# Patient Record
Sex: FEMALE | Race: WHITE | NOT HISPANIC OR LATINO | Employment: OTHER | URBAN - METROPOLITAN AREA
[De-identification: names, ages, dates, MRNs, and addresses within clinical notes are randomized per-mention and may not be internally consistent; named-entity substitution may affect disease eponyms.]

---

## 2018-02-12 ENCOUNTER — HISTORICAL (OUTPATIENT)
Dept: LAB | Facility: HOSPITAL | Age: 63
End: 2018-02-12

## 2018-02-12 LAB
ABS NEUT (OLG): 8.74 X10(3)/MCL (ref 2.1–9.2)
ALBUMIN SERPL-MCNC: 3.9 GM/DL (ref 3.4–5)
ALBUMIN/GLOB SERPL: 1.2 {RATIO}
ALP SERPL-CCNC: 73 UNIT/L (ref 38–126)
ALT SERPL-CCNC: 23 UNIT/L (ref 12–78)
AST SERPL-CCNC: 8 UNIT/L (ref 15–37)
BASOPHILS # BLD AUTO: 0.1 X10(3)/MCL (ref 0–0.2)
BASOPHILS NFR BLD AUTO: 1 %
BILIRUB SERPL-MCNC: 0.4 MG/DL (ref 0.2–1)
BILIRUBIN DIRECT+TOT PNL SERPL-MCNC: 0.1 MG/DL (ref 0–0.2)
BILIRUBIN DIRECT+TOT PNL SERPL-MCNC: 0.3 MG/DL (ref 0–0.8)
BUN SERPL-MCNC: 13 MG/DL (ref 7–18)
CALCIUM SERPL-MCNC: 9.6 MG/DL (ref 8.5–10.1)
CHLORIDE SERPL-SCNC: 102 MMOL/L (ref 98–107)
CO2 SERPL-SCNC: 24 MMOL/L (ref 21–32)
CREAT SERPL-MCNC: 0.61 MG/DL (ref 0.55–1.02)
EOSINOPHIL # BLD AUTO: 0.3 X10(3)/MCL (ref 0–0.9)
EOSINOPHIL NFR BLD AUTO: 2 %
ERYTHROCYTE [DISTWIDTH] IN BLOOD BY AUTOMATED COUNT: 13 % (ref 11.5–17)
GLOBULIN SER-MCNC: 3.3 GM/DL (ref 2.4–3.5)
GLUCOSE SERPL-MCNC: 130 MG/DL (ref 74–106)
HCT VFR BLD AUTO: 40.9 % (ref 37–47)
HGB BLD-MCNC: 13.1 GM/DL (ref 12–16)
LYMPHOCYTES # BLD AUTO: 3.1 X10(3)/MCL (ref 0.6–4.6)
LYMPHOCYTES NFR BLD AUTO: 24 %
MCH RBC QN AUTO: 27.8 PG (ref 27–31)
MCHC RBC AUTO-ENTMCNC: 32 GM/DL (ref 33–36)
MCV RBC AUTO: 86.8 FL (ref 80–94)
MONOCYTES # BLD AUTO: 0.9 X10(3)/MCL (ref 0.1–1.3)
MONOCYTES NFR BLD AUTO: 7 %
NEUTROPHILS # BLD AUTO: 8.74 X10(3)/MCL (ref 1.4–7.9)
NEUTROPHILS NFR BLD AUTO: 66 %
PLATELET # BLD AUTO: 348 X10(3)/MCL (ref 130–400)
PMV BLD AUTO: 10 FL (ref 9.4–12.4)
POTASSIUM SERPL-SCNC: 4.7 MMOL/L (ref 3.5–5.1)
PROT SERPL-MCNC: 7.2 GM/DL (ref 6.4–8.2)
RBC # BLD AUTO: 4.71 X10(6)/MCL (ref 4.2–5.4)
SODIUM SERPL-SCNC: 136 MMOL/L (ref 136–145)
WBC # SPEC AUTO: 13.2 X10(3)/MCL (ref 4.5–11.5)

## 2018-02-14 ENCOUNTER — HISTORICAL (OUTPATIENT)
Dept: RADIOLOGY | Facility: HOSPITAL | Age: 63
End: 2018-02-14

## 2018-02-21 ENCOUNTER — HISTORICAL (OUTPATIENT)
Dept: ADMINISTRATIVE | Facility: HOSPITAL | Age: 63
End: 2018-02-21

## 2018-03-26 ENCOUNTER — HISTORICAL (OUTPATIENT)
Dept: ADMINISTRATIVE | Facility: HOSPITAL | Age: 63
End: 2018-03-26

## 2018-03-26 LAB
ABS NEUT (OLG): 8.33 X10(3)/MCL (ref 2.1–9.2)
ALBUMIN SERPL-MCNC: 4.1 GM/DL (ref 3.4–5)
ALBUMIN/GLOB SERPL: 1.2 RATIO (ref 1.1–2)
ALP SERPL-CCNC: 85 UNIT/L (ref 38–126)
ALT SERPL-CCNC: 24 UNIT/L (ref 12–78)
AST SERPL-CCNC: 9 UNIT/L (ref 15–37)
BASOPHILS # BLD AUTO: 0.1 X10(3)/MCL (ref 0–0.2)
BASOPHILS NFR BLD AUTO: 0.5 %
BILIRUB SERPL-MCNC: 0.5 MG/DL (ref 0.2–1)
BILIRUBIN DIRECT+TOT PNL SERPL-MCNC: 0.1 MG/DL (ref 0–0.5)
BILIRUBIN DIRECT+TOT PNL SERPL-MCNC: 0.4 MG/DL (ref 0–0.8)
BUN SERPL-MCNC: 18 MG/DL (ref 7–18)
CALCIUM SERPL-MCNC: 9.8 MG/DL (ref 8.5–10.1)
CHLORIDE SERPL-SCNC: 105 MMOL/L (ref 98–107)
CO2 SERPL-SCNC: 25 MMOL/L (ref 21–32)
CREAT SERPL-MCNC: 0.71 MG/DL (ref 0.55–1.02)
DEPRECATED CALCIDIOL+CALCIFEROL SERPL-MC: 24.33 NG/ML (ref 30–80)
EOSINOPHIL # BLD AUTO: 0.3 X10(3)/MCL (ref 0–0.9)
EOSINOPHIL NFR BLD AUTO: 2.3 %
ERYTHROCYTE [DISTWIDTH] IN BLOOD BY AUTOMATED COUNT: 14 % (ref 11.5–17)
GLOBULIN SER-MCNC: 3.3 GM/DL (ref 2.4–3.5)
GLUCOSE SERPL-MCNC: 103 MG/DL (ref 74–106)
HCT VFR BLD AUTO: 39.6 % (ref 37–47)
HGB BLD-MCNC: 12.7 GM/DL (ref 12–16)
LYMPHOCYTES # BLD AUTO: 3.6 X10(3)/MCL (ref 0.6–4.6)
LYMPHOCYTES NFR BLD AUTO: 27 %
MCH RBC QN AUTO: 27.8 PG (ref 27–31)
MCHC RBC AUTO-ENTMCNC: 32.1 GM/DL (ref 33–36)
MCV RBC AUTO: 86.7 FL (ref 80–94)
MONOCYTES # BLD AUTO: 1 X10(3)/MCL (ref 0.1–1.3)
MONOCYTES NFR BLD AUTO: 7.3 %
NEUTROPHILS # BLD AUTO: 8.3 X10(3)/MCL (ref 2.1–9.2)
NEUTROPHILS NFR BLD AUTO: 62.9 %
PLATELET # BLD AUTO: 321 X10(3)/MCL (ref 130–400)
PMV BLD AUTO: 9.7 FL (ref 9.4–12.4)
POTASSIUM SERPL-SCNC: 4.1 MMOL/L (ref 3.5–5.1)
PROT SERPL-MCNC: 7.4 GM/DL (ref 6.4–8.2)
RBC # BLD AUTO: 4.57 X10(6)/MCL (ref 4.2–5.4)
SODIUM SERPL-SCNC: 137 MMOL/L (ref 136–145)
WBC # SPEC AUTO: 13.2 X10(3)/MCL (ref 4.5–11.5)

## 2018-04-03 ENCOUNTER — HISTORICAL (OUTPATIENT)
Dept: RADIOLOGY | Facility: HOSPITAL | Age: 63
End: 2018-04-03

## 2018-04-17 ENCOUNTER — HISTORICAL (OUTPATIENT)
Dept: RADIATION THERAPY | Facility: HOSPITAL | Age: 63
End: 2018-04-17

## 2018-04-20 ENCOUNTER — HISTORICAL (OUTPATIENT)
Dept: HEMATOLOGY/ONCOLOGY | Facility: CLINIC | Age: 63
End: 2018-04-20

## 2018-04-20 LAB
ABS NEUT (OLG): 5.28 X10(3)/MCL (ref 2.1–9.2)
BASOPHILS # BLD AUTO: 0.1 X10(3)/MCL (ref 0–0.2)
BASOPHILS NFR BLD AUTO: 0.6 %
EOSINOPHIL # BLD AUTO: 0.3 X10(3)/MCL (ref 0–0.9)
EOSINOPHIL NFR BLD AUTO: 3 %
ERYTHROCYTE [DISTWIDTH] IN BLOOD BY AUTOMATED COUNT: 14.4 % (ref 11.5–17)
HAV IGM SERPL QL IA: NEGATIVE
HBV CORE IGM SERPL QL IA: NEGATIVE
HBV SURFACE AG SERPL QL IA: NEGATIVE
HCT VFR BLD AUTO: 41.9 % (ref 37–47)
HCV AB SERPL QL IA: NEGATIVE
HEPATITIS PANEL INTERP: NORMAL
HGB BLD-MCNC: 13.4 GM/DL (ref 12–16)
HIV 1+2 AB+HIV1 P24 AG SERPL QL IA: NEGATIVE
LDH SERPL-CCNC: 145 UNIT/L (ref 84–246)
LYMPHOCYTES # BLD AUTO: 3.4 X10(3)/MCL (ref 0.6–4.6)
LYMPHOCYTES NFR BLD AUTO: 34.2 %
MCH RBC QN AUTO: 28 PG (ref 27–31)
MCHC RBC AUTO-ENTMCNC: 32 GM/DL (ref 33–36)
MCV RBC AUTO: 87.5 FL (ref 80–94)
MONOCYTES # BLD AUTO: 0.8 X10(3)/MCL (ref 0.1–1.3)
MONOCYTES NFR BLD AUTO: 8.3 %
NEUTROPHILS # BLD AUTO: 5.3 X10(3)/MCL (ref 2.1–9.2)
NEUTROPHILS NFR BLD AUTO: 53.9 %
PLATELET # BLD AUTO: 338 X10(3)/MCL (ref 130–400)
PMV BLD AUTO: 10.2 FL (ref 9.4–12.4)
RBC # BLD AUTO: 4.79 X10(6)/MCL (ref 4.2–5.4)
WBC # SPEC AUTO: 9.8 X10(3)/MCL (ref 4.5–11.5)

## 2018-04-24 ENCOUNTER — HISTORICAL (OUTPATIENT)
Dept: RADIATION THERAPY | Facility: HOSPITAL | Age: 63
End: 2018-04-24

## 2018-04-30 ENCOUNTER — HISTORICAL (OUTPATIENT)
Dept: RADIATION THERAPY | Facility: HOSPITAL | Age: 63
End: 2018-04-30

## 2018-05-08 ENCOUNTER — HISTORICAL (OUTPATIENT)
Dept: RADIATION THERAPY | Facility: HOSPITAL | Age: 63
End: 2018-05-08

## 2018-05-14 ENCOUNTER — HISTORICAL (OUTPATIENT)
Dept: RADIATION THERAPY | Facility: HOSPITAL | Age: 63
End: 2018-05-14

## 2018-05-15 ENCOUNTER — HISTORICAL (OUTPATIENT)
Dept: RADIATION THERAPY | Facility: HOSPITAL | Age: 63
End: 2018-05-15

## 2018-05-16 ENCOUNTER — HISTORICAL (OUTPATIENT)
Dept: RADIATION THERAPY | Facility: HOSPITAL | Age: 63
End: 2018-05-16

## 2018-05-17 ENCOUNTER — HISTORICAL (OUTPATIENT)
Dept: RADIATION THERAPY | Facility: HOSPITAL | Age: 63
End: 2018-05-17

## 2018-05-18 ENCOUNTER — HISTORICAL (OUTPATIENT)
Dept: RADIATION THERAPY | Facility: HOSPITAL | Age: 63
End: 2018-05-18

## 2018-05-21 ENCOUNTER — HISTORICAL (OUTPATIENT)
Dept: RADIATION THERAPY | Facility: HOSPITAL | Age: 63
End: 2018-05-21

## 2018-05-22 ENCOUNTER — HISTORICAL (OUTPATIENT)
Dept: RADIATION THERAPY | Facility: HOSPITAL | Age: 63
End: 2018-05-22

## 2018-05-23 ENCOUNTER — HISTORICAL (OUTPATIENT)
Dept: RADIATION THERAPY | Facility: HOSPITAL | Age: 63
End: 2018-05-23

## 2018-05-24 ENCOUNTER — HISTORICAL (OUTPATIENT)
Dept: RADIATION THERAPY | Facility: HOSPITAL | Age: 63
End: 2018-05-24

## 2018-05-25 ENCOUNTER — HISTORICAL (OUTPATIENT)
Dept: RADIATION THERAPY | Facility: HOSPITAL | Age: 63
End: 2018-05-25

## 2018-05-29 ENCOUNTER — HISTORICAL (OUTPATIENT)
Dept: RADIATION THERAPY | Facility: HOSPITAL | Age: 63
End: 2018-05-29

## 2018-05-30 ENCOUNTER — HISTORICAL (OUTPATIENT)
Dept: RADIATION THERAPY | Facility: HOSPITAL | Age: 63
End: 2018-05-30

## 2018-05-31 ENCOUNTER — HISTORICAL (OUTPATIENT)
Dept: RADIATION THERAPY | Facility: HOSPITAL | Age: 63
End: 2018-05-31

## 2018-06-01 ENCOUNTER — HISTORICAL (OUTPATIENT)
Dept: RADIATION THERAPY | Facility: HOSPITAL | Age: 63
End: 2018-06-01

## 2018-06-04 ENCOUNTER — HISTORICAL (OUTPATIENT)
Dept: RADIATION THERAPY | Facility: HOSPITAL | Age: 63
End: 2018-06-04

## 2018-06-05 ENCOUNTER — HISTORICAL (OUTPATIENT)
Dept: RADIATION THERAPY | Facility: HOSPITAL | Age: 63
End: 2018-06-05

## 2018-06-06 ENCOUNTER — HISTORICAL (OUTPATIENT)
Dept: RADIATION THERAPY | Facility: HOSPITAL | Age: 63
End: 2018-06-06

## 2018-06-07 ENCOUNTER — HISTORICAL (OUTPATIENT)
Dept: RADIATION THERAPY | Facility: HOSPITAL | Age: 63
End: 2018-06-07

## 2018-06-08 ENCOUNTER — HISTORICAL (OUTPATIENT)
Dept: RADIATION THERAPY | Facility: HOSPITAL | Age: 63
End: 2018-06-08

## 2018-06-11 ENCOUNTER — HISTORICAL (OUTPATIENT)
Dept: RADIATION THERAPY | Facility: HOSPITAL | Age: 63
End: 2018-06-11

## 2018-06-12 ENCOUNTER — HISTORICAL (OUTPATIENT)
Dept: RADIATION THERAPY | Facility: HOSPITAL | Age: 63
End: 2018-06-12

## 2018-06-12 ENCOUNTER — HISTORICAL (OUTPATIENT)
Dept: HEMATOLOGY/ONCOLOGY | Facility: CLINIC | Age: 63
End: 2018-06-12

## 2018-06-12 LAB
ABS NEUT (OLG): 6.51 X10(3)/MCL (ref 2.1–9.2)
ALBUMIN SERPL-MCNC: 3.8 GM/DL (ref 3.4–5)
ALBUMIN/GLOB SERPL: 1.2 {RATIO}
ALP SERPL-CCNC: 77 UNIT/L (ref 38–126)
ALT SERPL-CCNC: 24 UNIT/L (ref 12–78)
AST SERPL-CCNC: 5 UNIT/L (ref 15–37)
BASOPHILS # BLD AUTO: 0 X10(3)/MCL (ref 0–0.2)
BASOPHILS NFR BLD AUTO: 0.5 %
BILIRUB SERPL-MCNC: 0.3 MG/DL (ref 0.2–1)
BILIRUBIN DIRECT+TOT PNL SERPL-MCNC: 0.1 MG/DL (ref 0–0.2)
BILIRUBIN DIRECT+TOT PNL SERPL-MCNC: 0.2 MG/DL (ref 0–0.8)
BUN SERPL-MCNC: 14 MG/DL (ref 7–18)
CALCIUM SERPL-MCNC: 9.3 MG/DL (ref 8.5–10.1)
CHLORIDE SERPL-SCNC: 104 MMOL/L (ref 98–107)
CO2 SERPL-SCNC: 25 MMOL/L (ref 21–32)
CREAT SERPL-MCNC: 0.61 MG/DL (ref 0.55–1.02)
DEPRECATED CALCIDIOL+CALCIFEROL SERPL-MC: 28 NG/ML (ref 30–80)
EOSINOPHIL # BLD AUTO: 0.4 X10(3)/MCL (ref 0–0.9)
EOSINOPHIL NFR BLD AUTO: 3.6 %
ERYTHROCYTE [DISTWIDTH] IN BLOOD BY AUTOMATED COUNT: 13.8 % (ref 11.5–17)
GLOBULIN SER-MCNC: 3.2 GM/DL (ref 2.4–3.5)
GLUCOSE SERPL-MCNC: 105 MG/DL (ref 74–106)
HCT VFR BLD AUTO: 39.8 % (ref 37–47)
HGB BLD-MCNC: 12.9 GM/DL (ref 12–16)
LYMPHOCYTES # BLD AUTO: 2.4 X10(3)/MCL (ref 0.6–4.6)
LYMPHOCYTES NFR BLD AUTO: 23.2 %
MCH RBC QN AUTO: 28 PG (ref 27–31)
MCHC RBC AUTO-ENTMCNC: 32.4 GM/DL (ref 33–36)
MCV RBC AUTO: 86.3 FL (ref 80–94)
MONOCYTES # BLD AUTO: 0.9 X10(3)/MCL (ref 0.1–1.3)
MONOCYTES NFR BLD AUTO: 8.8 %
NEUTROPHILS # BLD AUTO: 6.5 X10(3)/MCL (ref 2.1–9.2)
NEUTROPHILS NFR BLD AUTO: 63.9 %
PLATELET # BLD AUTO: 289 X10(3)/MCL (ref 130–400)
PMV BLD AUTO: 9.7 FL (ref 9.4–12.4)
POTASSIUM SERPL-SCNC: 4.6 MMOL/L (ref 3.5–5.1)
PROT SERPL-MCNC: 7 GM/DL (ref 6.4–8.2)
RBC # BLD AUTO: 4.61 X10(6)/MCL (ref 4.2–5.4)
SODIUM SERPL-SCNC: 139 MMOL/L (ref 136–145)
WBC # SPEC AUTO: 10.2 X10(3)/MCL (ref 4.5–11.5)

## 2018-07-24 ENCOUNTER — HISTORICAL (OUTPATIENT)
Dept: HEMATOLOGY/ONCOLOGY | Facility: CLINIC | Age: 63
End: 2018-07-24

## 2018-07-24 LAB
ABS NEUT (OLG): 6.23 X10(3)/MCL (ref 2.1–9.2)
ALBUMIN SERPL-MCNC: 3.9 GM/DL (ref 3.4–5)
ALBUMIN/GLOB SERPL: 1.2 RATIO (ref 1.1–2)
ALP SERPL-CCNC: 77 UNIT/L (ref 38–126)
ALT SERPL-CCNC: 26 UNIT/L (ref 12–78)
AST SERPL-CCNC: 9 UNIT/L (ref 15–37)
BASOPHILS # BLD AUTO: 0.1 X10(3)/MCL (ref 0–0.2)
BASOPHILS NFR BLD AUTO: 0.6 %
BILIRUB SERPL-MCNC: 0.2 MG/DL (ref 0.2–1)
BILIRUBIN DIRECT+TOT PNL SERPL-MCNC: 0.1 MG/DL (ref 0–0.5)
BILIRUBIN DIRECT+TOT PNL SERPL-MCNC: 0.1 MG/DL (ref 0–0.8)
BUN SERPL-MCNC: 17 MG/DL (ref 7–18)
CALCIUM SERPL-MCNC: 9.6 MG/DL (ref 8.5–10.1)
CHLORIDE SERPL-SCNC: 105 MMOL/L (ref 98–107)
CO2 SERPL-SCNC: 26 MMOL/L (ref 21–32)
CREAT SERPL-MCNC: 0.56 MG/DL (ref 0.55–1.02)
EOSINOPHIL # BLD AUTO: 0.3 X10(3)/MCL (ref 0–0.9)
EOSINOPHIL NFR BLD AUTO: 2.8 %
ERYTHROCYTE [DISTWIDTH] IN BLOOD BY AUTOMATED COUNT: 13.9 % (ref 11.5–17)
GLOBULIN SER-MCNC: 3.3 GM/DL (ref 2.4–3.5)
GLUCOSE SERPL-MCNC: 88 MG/DL (ref 74–106)
HCT VFR BLD AUTO: 39.6 % (ref 37–47)
HGB BLD-MCNC: 12.9 GM/DL (ref 12–16)
LYMPHOCYTES # BLD AUTO: 2.4 X10(3)/MCL (ref 0.6–4.6)
LYMPHOCYTES NFR BLD AUTO: 24.6 %
MCH RBC QN AUTO: 28 PG (ref 27–31)
MCHC RBC AUTO-ENTMCNC: 32.6 GM/DL (ref 33–36)
MCV RBC AUTO: 85.9 FL (ref 80–94)
MONOCYTES # BLD AUTO: 1 X10(3)/MCL (ref 0.1–1.3)
MONOCYTES NFR BLD AUTO: 9.5 %
NEUTROPHILS # BLD AUTO: 6.2 X10(3)/MCL (ref 2.1–9.2)
NEUTROPHILS NFR BLD AUTO: 62.5 %
PLATELET # BLD AUTO: 328 X10(3)/MCL (ref 130–400)
PMV BLD AUTO: 10.1 FL (ref 9.4–12.4)
POTASSIUM SERPL-SCNC: 4.7 MMOL/L (ref 3.5–5.1)
PROT SERPL-MCNC: 7.2 GM/DL (ref 6.4–8.2)
RBC # BLD AUTO: 4.61 X10(6)/MCL (ref 4.2–5.4)
SODIUM SERPL-SCNC: 139 MMOL/L (ref 136–145)
WBC # SPEC AUTO: 10 X10(3)/MCL (ref 4.5–11.5)

## 2018-09-17 LAB — CRC RECOMMENDATION EXT: NORMAL

## 2018-09-19 ENCOUNTER — HISTORICAL (OUTPATIENT)
Dept: RADIATION THERAPY | Facility: HOSPITAL | Age: 63
End: 2018-09-19

## 2018-10-18 ENCOUNTER — HISTORICAL (OUTPATIENT)
Dept: HEMATOLOGY/ONCOLOGY | Facility: CLINIC | Age: 63
End: 2018-10-18

## 2018-10-18 LAB
ABS NEUT (OLG): 6.65 X10(3)/MCL (ref 2.1–9.2)
ALBUMIN SERPL-MCNC: 3.8 GM/DL (ref 3.4–5)
ALBUMIN/GLOB SERPL: 1.1 {RATIO}
ALP SERPL-CCNC: 81 UNIT/L (ref 38–126)
ALT SERPL-CCNC: 27 UNIT/L (ref 12–78)
AST SERPL-CCNC: 7 UNIT/L (ref 15–37)
BASOPHILS # BLD AUTO: 0.1 X10(3)/MCL (ref 0–0.2)
BASOPHILS NFR BLD AUTO: 0.6 %
BILIRUB SERPL-MCNC: 0.5 MG/DL (ref 0.2–1)
BILIRUBIN DIRECT+TOT PNL SERPL-MCNC: 0.1 MG/DL (ref 0–0.2)
BILIRUBIN DIRECT+TOT PNL SERPL-MCNC: 0.4 MG/DL (ref 0–0.8)
BUN SERPL-MCNC: 15 MG/DL (ref 7–18)
CALCIUM SERPL-MCNC: 8.9 MG/DL (ref 8.5–10.1)
CHLORIDE SERPL-SCNC: 107 MMOL/L (ref 98–107)
CO2 SERPL-SCNC: 25 MMOL/L (ref 21–32)
CREAT SERPL-MCNC: 0.65 MG/DL (ref 0.55–1.02)
DEPRECATED CALCIDIOL+CALCIFEROL SERPL-MC: 57.66 NG/ML (ref 30–80)
EOSINOPHIL # BLD AUTO: 0.4 X10(3)/MCL (ref 0–0.9)
EOSINOPHIL NFR BLD AUTO: 3.4 %
ERYTHROCYTE [DISTWIDTH] IN BLOOD BY AUTOMATED COUNT: 14.6 % (ref 11.5–17)
GLOBULIN SER-MCNC: 3.4 GM/DL (ref 2.4–3.5)
GLUCOSE SERPL-MCNC: 111 MG/DL (ref 74–106)
HCT VFR BLD AUTO: 39.5 % (ref 37–47)
HGB BLD-MCNC: 12.7 GM/DL (ref 12–16)
LYMPHOCYTES # BLD AUTO: 2.9 X10(3)/MCL (ref 0.6–4.6)
LYMPHOCYTES NFR BLD AUTO: 26.6 %
MCH RBC QN AUTO: 27.7 PG (ref 27–31)
MCHC RBC AUTO-ENTMCNC: 32.2 GM/DL (ref 33–36)
MCV RBC AUTO: 86.2 FL (ref 80–94)
MONOCYTES # BLD AUTO: 0.9 X10(3)/MCL (ref 0.1–1.3)
MONOCYTES NFR BLD AUTO: 8.4 %
NEUTROPHILS # BLD AUTO: 6.6 X10(3)/MCL (ref 2.1–9.2)
NEUTROPHILS NFR BLD AUTO: 61 %
PLATELET # BLD AUTO: 329 X10(3)/MCL (ref 130–400)
PMV BLD AUTO: 10.1 FL (ref 9.4–12.4)
POTASSIUM SERPL-SCNC: 5 MMOL/L (ref 3.5–5.1)
PROT SERPL-MCNC: 7.2 GM/DL (ref 6.4–8.2)
RBC # BLD AUTO: 4.58 X10(6)/MCL (ref 4.2–5.4)
SODIUM SERPL-SCNC: 141 MMOL/L (ref 136–145)
WBC # SPEC AUTO: 10.9 X10(3)/MCL (ref 4.5–11.5)

## 2018-10-25 ENCOUNTER — HISTORICAL (OUTPATIENT)
Dept: ADMINISTRATIVE | Facility: HOSPITAL | Age: 63
End: 2018-10-25

## 2018-12-12 ENCOUNTER — HISTORICAL (OUTPATIENT)
Dept: RADIATION THERAPY | Facility: HOSPITAL | Age: 63
End: 2018-12-12

## 2019-01-24 ENCOUNTER — HISTORICAL (OUTPATIENT)
Dept: HEMATOLOGY/ONCOLOGY | Facility: CLINIC | Age: 64
End: 2019-01-24

## 2019-01-24 LAB
ABS NEUT (OLG): 4.84 X10(3)/MCL (ref 2.1–9.2)
ALBUMIN SERPL-MCNC: 4.1 GM/DL (ref 3.4–5)
ALBUMIN/GLOB SERPL: 1.3 RATIO (ref 1.1–2)
ALP SERPL-CCNC: 82 UNIT/L (ref 38–126)
ALT SERPL-CCNC: 29 UNIT/L (ref 12–78)
AST SERPL-CCNC: 8 UNIT/L (ref 15–37)
BASOPHILS # BLD AUTO: 0 X10(3)/MCL (ref 0–0.2)
BASOPHILS NFR BLD AUTO: 0.6 %
BILIRUB SERPL-MCNC: 0.4 MG/DL (ref 0.2–1)
BILIRUBIN DIRECT+TOT PNL SERPL-MCNC: 0.1 MG/DL (ref 0–0.5)
BILIRUBIN DIRECT+TOT PNL SERPL-MCNC: 0.3 MG/DL (ref 0–0.8)
BUN SERPL-MCNC: 15 MG/DL (ref 7–18)
CALCIUM SERPL-MCNC: 9.6 MG/DL (ref 8.5–10.1)
CHLORIDE SERPL-SCNC: 104 MMOL/L (ref 98–107)
CO2 SERPL-SCNC: 28 MMOL/L (ref 21–32)
CREAT SERPL-MCNC: 0.54 MG/DL (ref 0.55–1.02)
DEPRECATED CALCIDIOL+CALCIFEROL SERPL-MC: 67.45 NG/ML (ref 30–80)
EOSINOPHIL # BLD AUTO: 0.2 X10(3)/MCL (ref 0–0.9)
EOSINOPHIL NFR BLD AUTO: 2.8 %
ERYTHROCYTE [DISTWIDTH] IN BLOOD BY AUTOMATED COUNT: 13.7 % (ref 11.5–17)
GLOBULIN SER-MCNC: 3.2 GM/DL (ref 2.4–3.5)
GLUCOSE SERPL-MCNC: 109 MG/DL (ref 74–106)
HCT VFR BLD AUTO: 42 % (ref 37–47)
HGB BLD-MCNC: 13.2 GM/DL (ref 12–16)
LYMPHOCYTES # BLD AUTO: 2.3 X10(3)/MCL (ref 0.6–4.6)
LYMPHOCYTES NFR BLD AUTO: 28.7 %
MCH RBC QN AUTO: 27.3 PG (ref 27–31)
MCHC RBC AUTO-ENTMCNC: 31.4 GM/DL (ref 33–36)
MCV RBC AUTO: 86.8 FL (ref 80–94)
MONOCYTES # BLD AUTO: 0.6 X10(3)/MCL (ref 0.1–1.3)
MONOCYTES NFR BLD AUTO: 7.8 %
NEUTROPHILS # BLD AUTO: 4.8 X10(3)/MCL (ref 2.1–9.2)
NEUTROPHILS NFR BLD AUTO: 60 %
PLATELET # BLD AUTO: 316 X10(3)/MCL (ref 130–400)
PMV BLD AUTO: 10.3 FL (ref 9.4–12.4)
POTASSIUM SERPL-SCNC: 5.1 MMOL/L (ref 3.5–5.1)
PROT SERPL-MCNC: 7.3 GM/DL (ref 6.4–8.2)
RBC # BLD AUTO: 4.84 X10(6)/MCL (ref 4.2–5.4)
SODIUM SERPL-SCNC: 138 MMOL/L (ref 136–145)
WBC # SPEC AUTO: 8.1 X10(3)/MCL (ref 4.5–11.5)

## 2019-01-31 ENCOUNTER — HISTORICAL (OUTPATIENT)
Dept: HEMATOLOGY/ONCOLOGY | Facility: CLINIC | Age: 64
End: 2019-01-31

## 2019-05-23 ENCOUNTER — HISTORICAL (OUTPATIENT)
Dept: HEMATOLOGY/ONCOLOGY | Facility: CLINIC | Age: 64
End: 2019-05-23

## 2019-05-23 LAB
ABS NEUT (OLG): 4.14 X10(3)/MCL (ref 2.1–9.2)
ALBUMIN SERPL-MCNC: 3.9 GM/DL (ref 3.4–5)
ALBUMIN/GLOB SERPL: 1.3 {RATIO}
ALP SERPL-CCNC: 78 UNIT/L (ref 38–126)
ALT SERPL-CCNC: 22 UNIT/L (ref 12–78)
AST SERPL-CCNC: 4 UNIT/L (ref 15–37)
BASOPHILS # BLD AUTO: 0 X10(3)/MCL (ref 0–0.2)
BASOPHILS NFR BLD AUTO: 0.5 %
BILIRUB SERPL-MCNC: 0.4 MG/DL (ref 0.2–1)
BILIRUBIN DIRECT+TOT PNL SERPL-MCNC: 0.1 MG/DL (ref 0–0.2)
BILIRUBIN DIRECT+TOT PNL SERPL-MCNC: 0.3 MG/DL (ref 0–0.8)
BUN SERPL-MCNC: 15 MG/DL (ref 7–18)
CALCIUM SERPL-MCNC: 9.2 MG/DL (ref 8.5–10.1)
CHLORIDE SERPL-SCNC: 106 MMOL/L (ref 98–107)
CO2 SERPL-SCNC: 27 MMOL/L (ref 21–32)
CREAT SERPL-MCNC: 0.58 MG/DL (ref 0.55–1.02)
DEPRECATED CALCIDIOL+CALCIFEROL SERPL-MC: 59.16 NG/ML (ref 30–80)
EOSINOPHIL # BLD AUTO: 0.2 X10(3)/MCL (ref 0–0.9)
EOSINOPHIL NFR BLD AUTO: 3.1 %
ERYTHROCYTE [DISTWIDTH] IN BLOOD BY AUTOMATED COUNT: 13.2 % (ref 11.5–17)
GLOBULIN SER-MCNC: 3.1 GM/DL (ref 2.4–3.5)
GLUCOSE SERPL-MCNC: 103 MG/DL (ref 74–106)
HCT VFR BLD AUTO: 40.4 % (ref 37–47)
HGB BLD-MCNC: 12.9 GM/DL (ref 12–16)
LYMPHOCYTES # BLD AUTO: 2.6 X10(3)/MCL (ref 0.6–4.6)
LYMPHOCYTES NFR BLD AUTO: 33.1 %
MCH RBC QN AUTO: 27.6 PG (ref 27–31)
MCHC RBC AUTO-ENTMCNC: 31.9 GM/DL (ref 33–36)
MCV RBC AUTO: 86.3 FL (ref 80–94)
MONOCYTES # BLD AUTO: 0.8 X10(3)/MCL (ref 0.1–1.3)
MONOCYTES NFR BLD AUTO: 9.7 %
NEUTROPHILS # BLD AUTO: 4.1 X10(3)/MCL (ref 2.1–9.2)
NEUTROPHILS NFR BLD AUTO: 53.3 %
PLATELET # BLD AUTO: 317 X10(3)/MCL (ref 130–400)
PMV BLD AUTO: 10.1 FL (ref 9.4–12.4)
POTASSIUM SERPL-SCNC: 4.3 MMOL/L (ref 3.5–5.1)
PROT SERPL-MCNC: 7 GM/DL (ref 6.4–8.2)
RBC # BLD AUTO: 4.68 X10(6)/MCL (ref 4.2–5.4)
SODIUM SERPL-SCNC: 139 MMOL/L (ref 136–145)
WBC # SPEC AUTO: 7.8 X10(3)/MCL (ref 4.5–11.5)

## 2019-06-21 ENCOUNTER — HISTORICAL (OUTPATIENT)
Dept: RADIATION THERAPY | Facility: HOSPITAL | Age: 64
End: 2019-06-21

## 2019-09-19 ENCOUNTER — HISTORICAL (OUTPATIENT)
Dept: HEMATOLOGY/ONCOLOGY | Facility: CLINIC | Age: 64
End: 2019-09-19

## 2019-09-19 LAB
ABS NEUT (OLG): 4.54 X10(3)/MCL (ref 2.1–9.2)
ALBUMIN SERPL-MCNC: 4 GM/DL (ref 3.4–5)
ALBUMIN/GLOB SERPL: 1.4 {RATIO}
ALP SERPL-CCNC: 77 UNIT/L (ref 38–126)
ALT SERPL-CCNC: 26 UNIT/L (ref 12–78)
AST SERPL-CCNC: 7 UNIT/L (ref 15–37)
BASOPHILS # BLD AUTO: 0 X10(3)/MCL (ref 0–0.2)
BASOPHILS NFR BLD AUTO: 0.4 %
BILIRUB SERPL-MCNC: 0.4 MG/DL (ref 0.2–1)
BILIRUBIN DIRECT+TOT PNL SERPL-MCNC: 0.1 MG/DL (ref 0–0.2)
BILIRUBIN DIRECT+TOT PNL SERPL-MCNC: 0.3 MG/DL (ref 0–0.8)
BUN SERPL-MCNC: 16 MG/DL (ref 7–18)
CALCIUM SERPL-MCNC: 9.9 MG/DL (ref 8.5–10.1)
CHLORIDE SERPL-SCNC: 104 MMOL/L (ref 98–107)
CO2 SERPL-SCNC: 27 MMOL/L (ref 21–32)
CREAT SERPL-MCNC: 0.61 MG/DL (ref 0.55–1.02)
DEPRECATED CALCIDIOL+CALCIFEROL SERPL-MC: 62.21 NG/ML (ref 30–80)
EOSINOPHIL # BLD AUTO: 0.3 X10(3)/MCL (ref 0–0.9)
EOSINOPHIL NFR BLD AUTO: 3.6 %
ERYTHROCYTE [DISTWIDTH] IN BLOOD BY AUTOMATED COUNT: 13.7 % (ref 11.5–17)
GLOBULIN SER-MCNC: 2.9 GM/DL (ref 2.4–3.5)
GLUCOSE SERPL-MCNC: 141 MG/DL (ref 74–106)
HCT VFR BLD AUTO: 42 % (ref 37–47)
HGB BLD-MCNC: 13.4 GM/DL (ref 12–16)
LYMPHOCYTES # BLD AUTO: 2.6 X10(3)/MCL (ref 0.6–4.6)
LYMPHOCYTES NFR BLD AUTO: 32.6 %
MCH RBC QN AUTO: 27.4 PG (ref 27–31)
MCHC RBC AUTO-ENTMCNC: 31.9 GM/DL (ref 33–36)
MCV RBC AUTO: 85.9 FL (ref 80–94)
MONOCYTES # BLD AUTO: 0.6 X10(3)/MCL (ref 0.1–1.3)
MONOCYTES NFR BLD AUTO: 6.9 %
NEUTROPHILS # BLD AUTO: 4.5 X10(3)/MCL (ref 2.1–9.2)
NEUTROPHILS NFR BLD AUTO: 56 %
PLATELET # BLD AUTO: 349 X10(3)/MCL (ref 130–400)
PMV BLD AUTO: 9.5 FL (ref 9.4–12.4)
POTASSIUM SERPL-SCNC: 4.5 MMOL/L (ref 3.5–5.1)
PROT SERPL-MCNC: 6.9 GM/DL (ref 6.4–8.2)
RBC # BLD AUTO: 4.89 X10(6)/MCL (ref 4.2–5.4)
SODIUM SERPL-SCNC: 138 MMOL/L (ref 136–145)
WBC # SPEC AUTO: 8.1 X10(3)/MCL (ref 4.5–11.5)

## 2019-12-11 ENCOUNTER — HISTORICAL (OUTPATIENT)
Dept: RADIATION THERAPY | Facility: HOSPITAL | Age: 64
End: 2019-12-11

## 2020-01-09 ENCOUNTER — HISTORICAL (OUTPATIENT)
Dept: HEMATOLOGY/ONCOLOGY | Facility: CLINIC | Age: 65
End: 2020-01-09

## 2020-01-09 LAB
ABS NEUT (OLG): 4.97 X10(3)/MCL (ref 2.1–9.2)
ALBUMIN SERPL-MCNC: 4 GM/DL (ref 3.4–5)
ALBUMIN/GLOB SERPL: 1.3 {RATIO}
ALP SERPL-CCNC: 78 UNIT/L (ref 38–126)
ALT SERPL-CCNC: 27 UNIT/L (ref 12–78)
AST SERPL-CCNC: 8 UNIT/L (ref 15–37)
BASOPHILS # BLD AUTO: 0 X10(3)/MCL (ref 0–0.2)
BASOPHILS NFR BLD AUTO: 0.4 %
BILIRUB SERPL-MCNC: 0.7 MG/DL (ref 0.2–1)
BILIRUBIN DIRECT+TOT PNL SERPL-MCNC: 0.1 MG/DL (ref 0–0.2)
BILIRUBIN DIRECT+TOT PNL SERPL-MCNC: 0.6 MG/DL (ref 0–0.8)
BUN SERPL-MCNC: 13 MG/DL (ref 7–18)
CALCIUM SERPL-MCNC: 9.8 MG/DL (ref 8.5–10.1)
CHLORIDE SERPL-SCNC: 105 MMOL/L (ref 98–107)
CO2 SERPL-SCNC: 27 MMOL/L (ref 21–32)
CREAT SERPL-MCNC: 0.67 MG/DL (ref 0.55–1.02)
DEPRECATED CALCIDIOL+CALCIFEROL SERPL-MC: 56.67 NG/ML (ref 30–80)
EOSINOPHIL # BLD AUTO: 0.3 X10(3)/MCL (ref 0–0.9)
EOSINOPHIL NFR BLD AUTO: 3 %
ERYTHROCYTE [DISTWIDTH] IN BLOOD BY AUTOMATED COUNT: 13.7 % (ref 11.5–17)
GLOBULIN SER-MCNC: 3 GM/DL (ref 2.4–3.5)
GLUCOSE SERPL-MCNC: 95 MG/DL (ref 74–106)
HCT VFR BLD AUTO: 41.1 % (ref 37–47)
HGB BLD-MCNC: 13.1 GM/DL (ref 12–16)
LYMPHOCYTES # BLD AUTO: 3.1 X10(3)/MCL (ref 0.6–4.6)
LYMPHOCYTES NFR BLD AUTO: 33.2 %
MCH RBC QN AUTO: 27.5 PG (ref 27–31)
MCHC RBC AUTO-ENTMCNC: 31.9 GM/DL (ref 33–36)
MCV RBC AUTO: 86.2 FL (ref 80–94)
MONOCYTES # BLD AUTO: 0.9 X10(3)/MCL (ref 0.1–1.3)
MONOCYTES NFR BLD AUTO: 9.3 %
NEUTROPHILS # BLD AUTO: 5 X10(3)/MCL (ref 2.1–9.2)
NEUTROPHILS NFR BLD AUTO: 53.9 %
PLATELET # BLD AUTO: 349 X10(3)/MCL (ref 130–400)
PMV BLD AUTO: 9.7 FL (ref 9.4–12.4)
POTASSIUM SERPL-SCNC: 4.5 MMOL/L (ref 3.5–5.1)
PROT SERPL-MCNC: 7 GM/DL (ref 6.4–8.2)
RBC # BLD AUTO: 4.77 X10(6)/MCL (ref 4.2–5.4)
SODIUM SERPL-SCNC: 138 MMOL/L (ref 136–145)
WBC # SPEC AUTO: 9.2 X10(3)/MCL (ref 4.5–11.5)

## 2020-05-07 ENCOUNTER — HISTORICAL (OUTPATIENT)
Dept: HEMATOLOGY/ONCOLOGY | Facility: CLINIC | Age: 65
End: 2020-05-07

## 2020-05-07 LAB
ABS NEUT (OLG): 4.53 X10(3)/MCL (ref 2.1–9.2)
ALBUMIN SERPL-MCNC: 4.1 GM/DL (ref 3.4–5)
ALBUMIN/GLOB SERPL: 1.4 RATIO (ref 1.1–2)
ALP SERPL-CCNC: 85 UNIT/L (ref 40–150)
ALT SERPL-CCNC: 17 UNIT/L (ref 0–55)
AST SERPL-CCNC: 10 UNIT/L (ref 5–34)
BASOPHILS # BLD AUTO: 0 X10(3)/MCL (ref 0–0.2)
BASOPHILS NFR BLD AUTO: 0.5 %
BILIRUB SERPL-MCNC: 0.4 MG/DL
BILIRUBIN DIRECT+TOT PNL SERPL-MCNC: 0.1 MG/DL (ref 0–0.5)
BILIRUBIN DIRECT+TOT PNL SERPL-MCNC: 0.3 MG/DL (ref 0–0.8)
BUN SERPL-MCNC: 17 MG/DL (ref 9.8–20.1)
CALCIUM SERPL-MCNC: 9.5 MG/DL (ref 8.4–10.2)
CHLORIDE SERPL-SCNC: 104 MMOL/L (ref 98–107)
CO2 SERPL-SCNC: 26 MMOL/L (ref 23–31)
CREAT SERPL-MCNC: 0.64 MG/DL (ref 0.55–1.02)
DEPRECATED CALCIDIOL+CALCIFEROL SERPL-MC: 43.4 NG/ML (ref 6.6–49.9)
EOSINOPHIL # BLD AUTO: 0.3 X10(3)/MCL (ref 0–0.9)
EOSINOPHIL NFR BLD AUTO: 3.5 %
ERYTHROCYTE [DISTWIDTH] IN BLOOD BY AUTOMATED COUNT: 14 % (ref 11.5–17)
GLOBULIN SER-MCNC: 2.9 GM/DL (ref 2.4–3.5)
GLUCOSE SERPL-MCNC: 109 MG/DL (ref 82–115)
HCT VFR BLD AUTO: 42.4 % (ref 37–47)
HGB BLD-MCNC: 13.5 GM/DL (ref 12–16)
LYMPHOCYTES # BLD AUTO: 2.8 X10(3)/MCL (ref 0.6–4.6)
LYMPHOCYTES NFR BLD AUTO: 33.1 %
MCH RBC QN AUTO: 27.6 PG (ref 27–31)
MCHC RBC AUTO-ENTMCNC: 31.8 GM/DL (ref 33–36)
MCV RBC AUTO: 86.5 FL (ref 80–94)
MONOCYTES # BLD AUTO: 0.9 X10(3)/MCL (ref 0.1–1.3)
MONOCYTES NFR BLD AUTO: 10 %
NEUTROPHILS # BLD AUTO: 4.5 X10(3)/MCL (ref 2.1–9.2)
NEUTROPHILS NFR BLD AUTO: 52.6 %
PLATELET # BLD AUTO: 334 X10(3)/MCL (ref 130–400)
PMV BLD AUTO: 9.7 FL (ref 9.4–12.4)
POTASSIUM SERPL-SCNC: 4.6 MMOL/L (ref 3.5–5.1)
PROT SERPL-MCNC: 7 GM/DL (ref 5.8–7.6)
RBC # BLD AUTO: 4.9 X10(6)/MCL (ref 4.2–5.4)
SODIUM SERPL-SCNC: 141 MMOL/L (ref 136–145)
WBC # SPEC AUTO: 8.6 X10(3)/MCL (ref 4.5–11.5)

## 2020-09-09 ENCOUNTER — HISTORICAL (OUTPATIENT)
Dept: HEMATOLOGY/ONCOLOGY | Facility: CLINIC | Age: 65
End: 2020-09-09

## 2020-09-09 LAB
ABS NEUT (OLG): 6.41 X10(3)/MCL (ref 2.1–9.2)
ALBUMIN SERPL-MCNC: 4.2 GM/DL (ref 3.4–5)
ALBUMIN/GLOB SERPL: 1.6 RATIO (ref 1.1–2)
ALP SERPL-CCNC: 85 UNIT/L (ref 40–150)
ALT SERPL-CCNC: 17 UNIT/L (ref 0–55)
AST SERPL-CCNC: 11 UNIT/L (ref 5–34)
BASOPHILS # BLD AUTO: 0.1 X10(3)/MCL (ref 0–0.2)
BASOPHILS NFR BLD AUTO: 0.9 %
BILIRUB SERPL-MCNC: 0.3 MG/DL
BILIRUBIN DIRECT+TOT PNL SERPL-MCNC: 0.1 MG/DL (ref 0–0.5)
BILIRUBIN DIRECT+TOT PNL SERPL-MCNC: 0.2 MG/DL (ref 0–0.8)
BMD RECOMMENDATION EXT: NORMAL
BUN SERPL-MCNC: 15.9 MG/DL (ref 9.8–20.1)
CALCIUM SERPL-MCNC: 9.1 MG/DL (ref 8.4–10.2)
CHLORIDE SERPL-SCNC: 105 MMOL/L (ref 98–107)
CO2 SERPL-SCNC: 24 MMOL/L (ref 23–31)
CREAT SERPL-MCNC: 0.82 MG/DL (ref 0.55–1.02)
EOSINOPHIL # BLD AUTO: 0.3 X10(3)/MCL (ref 0–0.9)
EOSINOPHIL NFR BLD AUTO: 2.7 %
ERYTHROCYTE [DISTWIDTH] IN BLOOD BY AUTOMATED COUNT: 13.6 % (ref 11.5–17)
GLOBULIN SER-MCNC: 2.7 GM/DL (ref 2.4–3.5)
GLUCOSE SERPL-MCNC: 98 MG/DL (ref 82–115)
HCT VFR BLD AUTO: 40.9 % (ref 37–47)
HGB BLD-MCNC: 13.3 GM/DL (ref 12–16)
LYMPHOCYTES # BLD AUTO: 2.7 X10(3)/MCL (ref 0.6–4.6)
LYMPHOCYTES NFR BLD AUTO: 26.1 %
MCH RBC QN AUTO: 27.9 PG (ref 27–31)
MCHC RBC AUTO-ENTMCNC: 32.5 GM/DL (ref 33–36)
MCV RBC AUTO: 85.7 FL (ref 80–94)
MONOCYTES # BLD AUTO: 1 X10(3)/MCL (ref 0.1–1.3)
MONOCYTES NFR BLD AUTO: 9.1 %
NEUTROPHILS # BLD AUTO: 6.4 X10(3)/MCL (ref 2.1–9.2)
NEUTROPHILS NFR BLD AUTO: 60.9 %
PLATELET # BLD AUTO: 352 X10(3)/MCL (ref 130–400)
PMV BLD AUTO: 9.8 FL (ref 9.4–12.4)
POTASSIUM SERPL-SCNC: 4.6 MMOL/L (ref 3.5–5.1)
PROT SERPL-MCNC: 6.9 GM/DL (ref 5.8–7.6)
RBC # BLD AUTO: 4.77 X10(6)/MCL (ref 4.2–5.4)
SODIUM SERPL-SCNC: 139 MMOL/L (ref 136–145)
WBC # SPEC AUTO: 10.5 X10(3)/MCL (ref 4.5–11.5)

## 2020-12-15 ENCOUNTER — HISTORICAL (OUTPATIENT)
Dept: RADIATION THERAPY | Facility: HOSPITAL | Age: 65
End: 2020-12-15

## 2021-01-07 ENCOUNTER — HISTORICAL (OUTPATIENT)
Dept: HEMATOLOGY/ONCOLOGY | Facility: CLINIC | Age: 66
End: 2021-01-07

## 2021-01-07 LAB
ABS NEUT (OLG): 6.19 X10(3)/MCL (ref 2.1–9.2)
ALBUMIN SERPL-MCNC: 4.1 GM/DL (ref 3.4–4.8)
ALBUMIN/GLOB SERPL: 1.3 RATIO (ref 1.1–2)
ALP SERPL-CCNC: 99 UNIT/L (ref 40–150)
ALT SERPL-CCNC: 17 UNIT/L (ref 0–55)
AST SERPL-CCNC: 11 UNIT/L (ref 5–34)
BASOPHILS # BLD AUTO: 0.1 X10(3)/MCL (ref 0–0.2)
BASOPHILS NFR BLD AUTO: 0.9 %
BILIRUB SERPL-MCNC: 0.3 MG/DL
BILIRUBIN DIRECT+TOT PNL SERPL-MCNC: 0.1 MG/DL (ref 0–0.5)
BILIRUBIN DIRECT+TOT PNL SERPL-MCNC: 0.2 MG/DL (ref 0–0.8)
BUN SERPL-MCNC: 16.8 MG/DL (ref 9.8–20.1)
CALCIUM SERPL-MCNC: 9.3 MG/DL (ref 8.4–10.2)
CHLORIDE SERPL-SCNC: 102 MMOL/L (ref 98–107)
CO2 SERPL-SCNC: 22 MMOL/L (ref 23–31)
CREAT SERPL-MCNC: 0.65 MG/DL (ref 0.55–1.02)
EOSINOPHIL # BLD AUTO: 0.3 X10(3)/MCL (ref 0–0.9)
EOSINOPHIL NFR BLD AUTO: 2.8 %
ERYTHROCYTE [DISTWIDTH] IN BLOOD BY AUTOMATED COUNT: 13.6 % (ref 11.5–17)
GLOBULIN SER-MCNC: 3.1 GM/DL (ref 2.4–3.5)
GLUCOSE SERPL-MCNC: 134 MG/DL (ref 82–115)
HCT VFR BLD AUTO: 40.7 % (ref 37–47)
HGB BLD-MCNC: 13.3 GM/DL (ref 12–16)
LYMPHOCYTES # BLD AUTO: 3.1 X10(3)/MCL (ref 0.6–4.6)
LYMPHOCYTES NFR BLD AUTO: 29.1 %
MCH RBC QN AUTO: 27.9 PG (ref 27–31)
MCHC RBC AUTO-ENTMCNC: 32.7 GM/DL (ref 33–36)
MCV RBC AUTO: 85.5 FL (ref 80–94)
MONOCYTES # BLD AUTO: 0.8 X10(3)/MCL (ref 0.1–1.3)
MONOCYTES NFR BLD AUTO: 8.1 %
NEUTROPHILS # BLD AUTO: 6.2 X10(3)/MCL (ref 2.1–9.2)
NEUTROPHILS NFR BLD AUTO: 58.7 %
PLATELET # BLD AUTO: 353 X10(3)/MCL (ref 130–400)
PMV BLD AUTO: 9.6 FL (ref 9.4–12.4)
POTASSIUM SERPL-SCNC: 4.4 MMOL/L (ref 3.5–5.1)
PROT SERPL-MCNC: 7.2 GM/DL (ref 5.8–7.6)
RBC # BLD AUTO: 4.76 X10(6)/MCL (ref 4.2–5.4)
SODIUM SERPL-SCNC: 136 MMOL/L (ref 136–145)
WBC # SPEC AUTO: 10.5 X10(3)/MCL (ref 4.5–11.5)

## 2021-03-10 ENCOUNTER — HISTORICAL (OUTPATIENT)
Dept: ADMINISTRATIVE | Facility: HOSPITAL | Age: 66
End: 2021-03-10

## 2021-03-16 ENCOUNTER — HISTORICAL (OUTPATIENT)
Dept: ADMINISTRATIVE | Facility: HOSPITAL | Age: 66
End: 2021-03-16

## 2021-03-16 LAB
ABS NEUT (OLG): 5.28 X10(3)/MCL (ref 2.1–9.2)
ALBUMIN SERPL-MCNC: 4.1 GM/DL (ref 3.4–4.8)
ALBUMIN/GLOB SERPL: 1.5 RATIO (ref 1.1–2)
ALP SERPL-CCNC: 75 UNIT/L (ref 40–150)
ALT SERPL-CCNC: 19 UNIT/L (ref 0–55)
AST SERPL-CCNC: 11 UNIT/L (ref 5–34)
BASOPHILS # BLD AUTO: 0.1 X10(3)/MCL (ref 0–0.2)
BASOPHILS NFR BLD AUTO: 1 %
BILIRUB SERPL-MCNC: 0.5 MG/DL
BILIRUBIN DIRECT+TOT PNL SERPL-MCNC: 0.2 MG/DL (ref 0–0.5)
BILIRUBIN DIRECT+TOT PNL SERPL-MCNC: 0.3 MG/DL (ref 0–0.8)
BUN SERPL-MCNC: 14.6 MG/DL (ref 9.8–20.1)
CALCIUM SERPL-MCNC: 9.1 MG/DL (ref 8.4–10.2)
CHLORIDE SERPL-SCNC: 101 MMOL/L (ref 98–107)
CHOLEST SERPL-MCNC: 224 MG/DL
CHOLEST/HDLC SERPL: 5 {RATIO} (ref 0–5)
CO2 SERPL-SCNC: 24 MMOL/L (ref 23–31)
CREAT SERPL-MCNC: 0.61 MG/DL (ref 0.55–1.02)
DEPRECATED CALCIDIOL+CALCIFEROL SERPL-MC: 31.6 NG/ML (ref 30–80)
EOSINOPHIL # BLD AUTO: 0.4 X10(3)/MCL (ref 0–0.9)
EOSINOPHIL NFR BLD AUTO: 4 %
ERYTHROCYTE [DISTWIDTH] IN BLOOD BY AUTOMATED COUNT: 13.7 % (ref 11.5–17)
EST. AVERAGE GLUCOSE BLD GHB EST-MCNC: 114 MG/DL
GLOBULIN SER-MCNC: 2.8 GM/DL (ref 2.4–3.5)
GLUCOSE SERPL-MCNC: 115 MG/DL (ref 82–115)
HBA1C MFR BLD: 5.6 %
HCT VFR BLD AUTO: 42.2 % (ref 37–47)
HDLC SERPL-MCNC: 49 MG/DL (ref 35–60)
HGB BLD-MCNC: 13.3 GM/DL (ref 12–16)
LDLC SERPL CALC-MCNC: 141 MG/DL (ref 50–140)
LYMPHOCYTES # BLD AUTO: 2.7 X10(3)/MCL (ref 0.6–4.6)
LYMPHOCYTES NFR BLD AUTO: 29 %
MCH RBC QN AUTO: 27.7 PG (ref 27–31)
MCHC RBC AUTO-ENTMCNC: 31.5 GM/DL (ref 33–36)
MCV RBC AUTO: 87.9 FL (ref 80–94)
MONOCYTES # BLD AUTO: 0.8 X10(3)/MCL (ref 0.1–1.3)
MONOCYTES NFR BLD AUTO: 9 %
NEUTROPHILS # BLD AUTO: 5.28 X10(3)/MCL (ref 2.1–9.2)
NEUTROPHILS NFR BLD AUTO: 57 %
PLATELET # BLD AUTO: 351 X10(3)/MCL (ref 130–400)
PMV BLD AUTO: 10.5 FL (ref 9.4–12.4)
POTASSIUM SERPL-SCNC: 4.5 MMOL/L (ref 3.5–5.1)
PROT SERPL-MCNC: 6.9 GM/DL (ref 5.8–7.6)
RBC # BLD AUTO: 4.8 X10(6)/MCL (ref 4.2–5.4)
SODIUM SERPL-SCNC: 135 MMOL/L (ref 136–145)
TRIGL SERPL-MCNC: 170 MG/DL (ref 37–140)
TSH SERPL-ACNC: 1.49 UIU/ML (ref 0.35–4.94)
VLDLC SERPL CALC-MCNC: 34 MG/DL
WBC # SPEC AUTO: 9.3 X10(3)/MCL (ref 4.5–11.5)

## 2021-05-06 ENCOUNTER — HISTORICAL (OUTPATIENT)
Dept: HEMATOLOGY/ONCOLOGY | Facility: CLINIC | Age: 66
End: 2021-05-06

## 2021-05-06 LAB
ABS NEUT (OLG): 5.44 X10(3)/MCL (ref 2.1–9.2)
ALBUMIN SERPL-MCNC: 4.2 GM/DL (ref 3.4–4.8)
ALBUMIN/GLOB SERPL: 1.6 RATIO (ref 1.1–2)
ALP SERPL-CCNC: 81 UNIT/L (ref 40–150)
ALT SERPL-CCNC: 15 UNIT/L (ref 0–55)
AST SERPL-CCNC: 9 UNIT/L (ref 5–34)
BASOPHILS # BLD AUTO: 0.1 X10(3)/MCL (ref 0–0.2)
BASOPHILS NFR BLD AUTO: 0.8 %
BILIRUB SERPL-MCNC: 0.3 MG/DL
BILIRUBIN DIRECT+TOT PNL SERPL-MCNC: 0.1 MG/DL (ref 0–0.5)
BILIRUBIN DIRECT+TOT PNL SERPL-MCNC: 0.2 MG/DL (ref 0–0.8)
BUN SERPL-MCNC: 13.5 MG/DL (ref 9.8–20.1)
CALCIUM SERPL-MCNC: 10 MG/DL (ref 8.4–10.2)
CHLORIDE SERPL-SCNC: 102 MMOL/L (ref 98–107)
CO2 SERPL-SCNC: 28 MMOL/L (ref 23–31)
CREAT SERPL-MCNC: 0.71 MG/DL (ref 0.55–1.02)
DEPRECATED CALCIDIOL+CALCIFEROL SERPL-MC: 36.2 NG/ML (ref 30–80)
EOSINOPHIL # BLD AUTO: 0.3 X10(3)/MCL (ref 0–0.9)
EOSINOPHIL NFR BLD AUTO: 3.2 %
ERYTHROCYTE [DISTWIDTH] IN BLOOD BY AUTOMATED COUNT: 13.2 % (ref 11.5–17)
GLOBULIN SER-MCNC: 2.7 GM/DL (ref 2.4–3.5)
GLUCOSE SERPL-MCNC: 111 MG/DL (ref 82–115)
HCT VFR BLD AUTO: 40 % (ref 37–47)
HGB BLD-MCNC: 12.9 GM/DL (ref 12–16)
LYMPHOCYTES # BLD AUTO: 3 X10(3)/MCL (ref 0.6–4.6)
LYMPHOCYTES NFR BLD AUTO: 31.4 %
MCH RBC QN AUTO: 28 PG (ref 27–31)
MCHC RBC AUTO-ENTMCNC: 32.3 GM/DL (ref 33–36)
MCV RBC AUTO: 86.8 FL (ref 80–94)
MONOCYTES # BLD AUTO: 0.8 X10(3)/MCL (ref 0.1–1.3)
MONOCYTES NFR BLD AUTO: 8 %
NEUTROPHILS # BLD AUTO: 5.4 X10(3)/MCL (ref 2.1–9.2)
NEUTROPHILS NFR BLD AUTO: 56.2 %
PLATELET # BLD AUTO: 339 X10(3)/MCL (ref 130–400)
PMV BLD AUTO: 9.8 FL (ref 9.4–12.4)
POTASSIUM SERPL-SCNC: 4.8 MMOL/L (ref 3.5–5.1)
PROT SERPL-MCNC: 6.9 GM/DL (ref 5.8–7.6)
RBC # BLD AUTO: 4.61 X10(6)/MCL (ref 4.2–5.4)
SODIUM SERPL-SCNC: 138 MMOL/L (ref 136–145)
WBC # SPEC AUTO: 9.7 X10(3)/MCL (ref 4.5–11.5)

## 2021-09-09 ENCOUNTER — HISTORICAL (OUTPATIENT)
Dept: HEMATOLOGY/ONCOLOGY | Facility: CLINIC | Age: 66
End: 2021-09-09

## 2021-09-09 LAB
ABS NEUT (OLG): 7.28 X10(3)/MCL (ref 2.1–9.2)
ALBUMIN SERPL-MCNC: 4.2 GM/DL (ref 3.4–4.8)
ALBUMIN/GLOB SERPL: 1.4 RATIO (ref 1.1–2)
ALP SERPL-CCNC: 98 UNIT/L (ref 40–150)
ALT SERPL-CCNC: 16 UNIT/L (ref 0–55)
AST SERPL-CCNC: 13 UNIT/L (ref 5–34)
BASOPHILS # BLD AUTO: 0.1 X10(3)/MCL (ref 0–0.2)
BASOPHILS NFR BLD AUTO: 0.5 %
BILIRUB SERPL-MCNC: 0.3 MG/DL
BILIRUBIN DIRECT+TOT PNL SERPL-MCNC: 0.1 MG/DL (ref 0–0.5)
BILIRUBIN DIRECT+TOT PNL SERPL-MCNC: 0.2 MG/DL (ref 0–0.8)
BUN SERPL-MCNC: 13.3 MG/DL (ref 9.8–20.1)
CALCIUM SERPL-MCNC: 9.8 MG/DL (ref 8.4–10.2)
CHLORIDE SERPL-SCNC: 104 MMOL/L (ref 98–107)
CO2 SERPL-SCNC: 24 MMOL/L (ref 23–31)
CREAT SERPL-MCNC: 0.67 MG/DL (ref 0.55–1.02)
DEPRECATED CALCIDIOL+CALCIFEROL SERPL-MC: 34.8 NG/ML (ref 30–80)
EOSINOPHIL # BLD AUTO: 0.3 X10(3)/MCL (ref 0–0.9)
EOSINOPHIL NFR BLD AUTO: 2.7 %
ERYTHROCYTE [DISTWIDTH] IN BLOOD BY AUTOMATED COUNT: 13.7 % (ref 11.5–17)
GLOBULIN SER-MCNC: 3 GM/DL (ref 2.4–3.5)
GLUCOSE SERPL-MCNC: 106 MG/DL (ref 82–115)
HCT VFR BLD AUTO: 43.1 % (ref 37–47)
HGB BLD-MCNC: 13.7 GM/DL (ref 12–16)
LYMPHOCYTES # BLD AUTO: 2.9 X10(3)/MCL (ref 0.6–4.6)
LYMPHOCYTES NFR BLD AUTO: 24.9 %
MCH RBC QN AUTO: 28.1 PG (ref 27–31)
MCHC RBC AUTO-ENTMCNC: 31.8 GM/DL (ref 33–36)
MCV RBC AUTO: 88.5 FL (ref 80–94)
MONOCYTES # BLD AUTO: 1.1 X10(3)/MCL (ref 0.1–1.3)
MONOCYTES NFR BLD AUTO: 9.2 %
NEUTROPHILS # BLD AUTO: 7.3 X10(3)/MCL (ref 2.1–9.2)
NEUTROPHILS NFR BLD AUTO: 62.5 %
PLATELET # BLD AUTO: 364 X10(3)/MCL (ref 130–400)
PMV BLD AUTO: 10 FL (ref 9.4–12.4)
POTASSIUM SERPL-SCNC: 4.7 MMOL/L (ref 3.5–5.1)
PROT SERPL-MCNC: 7.2 GM/DL (ref 5.8–7.6)
RBC # BLD AUTO: 4.87 X10(6)/MCL (ref 4.2–5.4)
SODIUM SERPL-SCNC: 139 MMOL/L (ref 136–145)
WBC # SPEC AUTO: 11.6 X10(3)/MCL (ref 4.5–11.5)

## 2021-11-12 LAB
PAP RECOMMENDATION EXT: NORMAL
PAP SMEAR: NORMAL

## 2022-01-06 LAB — BCS RECOMMENDATION EXT: NORMAL

## 2022-03-09 ENCOUNTER — HISTORICAL (OUTPATIENT)
Dept: HEMATOLOGY/ONCOLOGY | Facility: CLINIC | Age: 67
End: 2022-03-09

## 2022-03-09 LAB
ABS NEUT (OLG): 7.59 (ref 2.1–9.2)
ALBUMIN SERPL-MCNC: 4.3 G/DL (ref 3.4–4.8)
ALBUMIN/GLOB SERPL: 1.4 {RATIO} (ref 1.1–2)
ALP SERPL-CCNC: 86 U/L (ref 40–150)
ALT SERPL-CCNC: 16 U/L (ref 0–55)
AST SERPL-CCNC: 10 U/L (ref 5–34)
BASOPHILS # BLD AUTO: 0.1 10*3/UL (ref 0–0.2)
BASOPHILS NFR BLD AUTO: 0.7 %
BILIRUB SERPL-MCNC: 0.4 MG/DL
BILIRUBIN DIRECT+TOT PNL SERPL-MCNC: 0.1 (ref 0–0.5)
BILIRUBIN DIRECT+TOT PNL SERPL-MCNC: 0.3 (ref 0–0.8)
BUN SERPL-MCNC: 15.9 MG/DL (ref 9.8–20.1)
CALCIUM SERPL-MCNC: 9.5 MG/DL (ref 8.7–10.5)
CHLORIDE SERPL-SCNC: 103 MMOL/L (ref 98–107)
CO2 SERPL-SCNC: 26 MMOL/L (ref 23–31)
CREAT SERPL-MCNC: 0.74 MG/DL (ref 0.55–1.02)
DEPRECATED CALCIDIOL+CALCIFEROL SERPL-MC: 35.7 NG/ML (ref 30–80)
EOSINOPHIL # BLD AUTO: 0.3 10*3/UL (ref 0–0.9)
EOSINOPHIL NFR BLD AUTO: 2.2 %
ERYTHROCYTE [DISTWIDTH] IN BLOOD BY AUTOMATED COUNT: 13 % (ref 11.5–17)
GLOBULIN SER-MCNC: 3 G/DL (ref 2.4–3.5)
GLUCOSE SERPL-MCNC: 102 MG/DL (ref 82–115)
HCT VFR BLD AUTO: 42.2 % (ref 37–47)
HEMOLYSIS INTERF INDEX SERPL-ACNC: 8
HGB BLD-MCNC: 13.7 G/DL (ref 12–16)
ICTERIC INTERF INDEX SERPL-ACNC: 1
LIPEMIC INTERF INDEX SERPL-ACNC: 3
LYMPHOCYTES # BLD AUTO: 3 10*3/UL (ref 0.6–4.6)
LYMPHOCYTES NFR BLD AUTO: 25.3 %
MANUAL DIFF? (OHS): NO
MCH RBC QN AUTO: 28.1 PG (ref 27–31)
MCHC RBC AUTO-ENTMCNC: 32.5 G/DL (ref 33–36)
MCV RBC AUTO: 86.5 FL (ref 80–94)
MONOCYTES # BLD AUTO: 1 10*3/UL (ref 0.1–1.3)
MONOCYTES NFR BLD AUTO: 8.3 %
NEUTROPHILS # BLD AUTO: 7.6 10*3/UL (ref 2.1–9.2)
NEUTROPHILS NFR BLD AUTO: 63.3 %
PLATELET # BLD AUTO: 373 10*3/UL (ref 130–400)
PMV BLD AUTO: 9.7 FL (ref 9.4–12.4)
POTASSIUM SERPL-SCNC: 4.6 MMOL/L (ref 3.5–5.1)
PROT SERPL-MCNC: 7.3 G/DL (ref 5.8–7.6)
RBC # BLD AUTO: 4.88 10*6/UL (ref 4.2–5.4)
SODIUM SERPL-SCNC: 137 MMOL/L (ref 136–145)
WBC # SPEC AUTO: 12 10*3/UL (ref 4.5–11.5)

## 2022-03-23 ENCOUNTER — HISTORICAL (OUTPATIENT)
Dept: RADIATION THERAPY | Facility: HOSPITAL | Age: 67
End: 2022-03-23

## 2022-04-10 ENCOUNTER — HISTORICAL (OUTPATIENT)
Dept: ADMINISTRATIVE | Facility: HOSPITAL | Age: 67
End: 2022-04-10
Payer: MEDICARE

## 2022-04-28 VITALS
BODY MASS INDEX: 38.86 KG/M2 | OXYGEN SATURATION: 98 % | WEIGHT: 233.25 LBS | SYSTOLIC BLOOD PRESSURE: 136 MMHG | DIASTOLIC BLOOD PRESSURE: 62 MMHG | HEIGHT: 65 IN

## 2022-04-30 NOTE — OP NOTE
Moved to correct encounter.    * Final Report *    OP (Verified)  DATE OF SURGERY:    02/21/2018    SURGEON:  Johnny Hutchison IV, MD    PREOPERATIVE DIAGNOSIS:  2 cm left lateral breast cancer, invasive ductal, ER positive, KS positive, HER-2 negative, Ki 67, 33%.    POSTOPERATIVE DIAGNOSIS:  2 cm left lateral breast cancer, invasive ductal, ER positive, KS positive, HER-2 negative, Ki 67, 33%.    PROCEDURES PERFORMED:    1. Injection of 1 mCi of Technetium sulfur colloid.  2. Injection of 0.3 mL of Isosulfan blue dye.  3. Right breast ultrasound guided lumpectomy.  4. Left axillary sentinel lymph node biopsy via lumpectomy incision.  5. Excision of encompassing medial and inferior margin.  6. Closure by local tissue transfer 6 x 8 cm.    ANESTHESIA:  General with local infiltration.    ASSISTANT:  Ju Fan.    ESTIMATED BLOOD LOSS:  Less than 10 mL.    SPECIMENS:    1. Lumpectomy.  2. Left axillary sentinel lymph node.  3. Encompassing margin.    DETAILS OF PROCEDURE:  After proper informed consent was obtained the patient was transported to the operating room where she was placed supine on the operating table.  After an adequate level of general anesthesia by LMA was achieved, the patient's left breast was prepped with alcohol at the lateral aspect of the nipple areolar complex and 1 mCi of Technetium sulfur colloid with immediate adjacent injection of 0.3 cc of Isosulfan blue dye was made into the dermis just lateral to the nipple areolar complex.  The breast, arm, axilla, and chest were then prepped and draped in standard sterile surgical fashion.  The operation commenced with ultrasound interrogation of the left breast revealing the biopsy clip in the area of pathological change.  An elliptical incision approximately 6 cm in length was then planned over this area.  Initial skin incision with scalpel followed by Bovie electrocautery dissection guided by ultrasound interrogation and palpation.  We then  excised the lesion in its entirety with the associated terminal ductal lobular unit of the breast and passed it off the field for pathological review after being marked.  Grossing of the specimen revealed that it was fairly close to the medial inferior margin; subsequent encompassing medial and inferior margin was obtained.  This was marked and passed off the field as specimen.  In the interim the breast was mobilized up off the pectoral fascia.  The lateral pectoral edge was identified.  Neoprobe interrogation was then made in the axilla through the left upper outer quadrant lumpectomy wound.  Guided by the Neoprobe interrogation we enter the axillary fashion, dissected bluntly through the fat encountering a single dominant hot blue lymph node.  That lymph node was excised over 5 titanium clips and passed off the field as specimen resulting in greater than 95% reduction in background radiation.  The axilla was assured to be hemostatic.  It was then closed in multiple layers with 3-0 Vicryl suture followed by infiltration of local anesthetic.  The inferior lateral breast tissue was then elevated up off the pectoral fascia and rotated medially in to fill the defect.  It was closed in multiple levels with 3-0 Vicryl suture followed by 3-0 Vicryl dermal closure and 4-0 Monocryl skin closure.  Sterile dressings were applied.  The patient was awakened from anesthesia and transported to the recovery room in good and stable condition.  All sponge, needle, and instrument counts were correct at the end of the case.  There were no complications.        ______________________________  Johnny ALONZO. Foster ESPINAL MD    HJK/UK  DD:  02/23/2018  Time:  01:40PM  DT:  02/25/2018  Time:  03:24PM  Job #:  [id number]    cc: Alin Vega MD     Result type: Operative Report  Result date: February 25, 2018 15:24 CST  Result status: Auth (Verified)  Result title: OP  Performed by: Johnny Hutchison IV, MD on February 23, 2018 13:40  CST  Verified by: Foster ESPINAL MD, Johnny ALONZO on February 26, 2018 17:01 CST  Encounter info: [id number], [facility], [facility]  Contributor system: MEDYULIA

## 2022-04-30 NOTE — PROGRESS NOTES
Patient:   Arianna Rizvi            MRN: [id number]            FIN: [id number]               Age:   64 years     Sex:  Female     :  1955   Associated Diagnoses:   None   Author:   Magdiel Bullard MD        Referring Physician: Dr Hutchison  PCP: Dr Persaud      Visit Information     Problem List:  1. [id number] well differentiated IDC of the left breast, ER+ 95%/TX+ 95%/Nyn5rat negative dx 18.  Oncotype DX score came back at 17 placing her in the low risk category  2. Focal ductal hyperplasia of the left breast.   3. Fibrocystic changes of the left breast  4. Remote history of benign right breast lesion status post lumpectomy in   5. Chronic leukocytosis, since   6. Normal bone density at baseline  7. Vitamin D Deficiency-- now on weekly therapy.     Current Treatment:  Femara 2.5 mg p.o. daily started 2018.       Treatment History:  1.  Left breast lumpectomy and sentinel lymph node biopsy performed 2018--> pathology showed 1.5 cm primary tumor, well-differentiated with Ki-67 score of 32% and one sentinel lymph node with micrometastases (>0.2mm but < 2mm)  2. Adjvant radiation therapy 18- 18     HPI/Clinical History:  This is a very pleasant 63-year-old female kindly referred by Dr. Hutchison for further evaluation workup following lumpectomy for well-differentiated infiltrating ductal carcinoma the left breast. The patient's history is remarkable for routine screening mammography performed 2017 which revealed a suspicious 1 cm mass with indistinct margins in the upper outer quadrant of the left breast.  She then required additional views of the left breast with ultrasound performed 2017 which revealed irregular shaped hypoechoic mass at the 3 o'clock position of the left breast 3 cm from the nipple measuring 1.7 x 1.7 x 2 cm. The patient was then set up for ultrasound guided vacuum-assisted needle core biopsy done 2018.  Pathology came back showing well-differentiated infiltrating ductal carcinoma, ER positive at 95%, UT +95%, and HER-2/rosalino negative. Ki-67 score of 32%.    The patient was referred to Dr. Hutchison and underwent preoperative breast MRI performed Fabry 14 2018. This revealed a 2 cm lobulated spiculated enhancing mass at the 3 o'clock position of the left breast. No significant or concerning internal mammary or axillary adenopathy. No other suspicious enhancing lesions in either breast.  The patient was then taken to the operating room on February 21, 2018 and underwent lumpectomy with sentinel lymph node biopsy of the left breast. Pathology revealed a 1.5 cm primary tumor well-differentiated , with clear margins. Micrometastases and one sentinel lymph node as described above. Pathologic T1c N1(mi).  The patient recovered very well from surgery and was then referred to medical oncology.  Oncotype DX assay sent off --> came back with score of 17 placing her in the low risk category for recurrence  Baseline DEXA scan performed April 3, 2018 shows normal bone density with T score of 2.5 in the AP spine and -1.0 in the right hip and -0.1 in the left hip.  In addition, workup for leukocytosis on April 16, 2018 showed no evidence of any dysplastic cells on peripheral smear.  HIV and hepatitis panels negative/nonreactive.  LDH normal at 145.  WBC had come back down to normal as well at 9.8.  S/p completion of adjuvant radiation therapy 4/30/18- 6/11/18  She underwent mammogram of the left breast done June 27, 2018 which showed BI-RADS 2 with recommendation for routine screening mammogram December 2018 of her bilateral breasts.  The patient agreed to start on aromatase inhibitor therapy after patient education was started on Femara towards the end of June 2018.  Xrays of hands done 12/18 showed osteoarthritis in both hands.   Patient saw Dr. Medina and was referred for PT.  B/L MMG done 12/18/18 with Dr. Jen Fournier:    Birads 2. LEROY.       PMHx:  Aortic insufficiency, Mitral regurg--> followed by cardiology  PSHx:  Left breast lumpectomy, hysterectomy in 2016, right breast lumpectomy for benign lesion in 1995, myomectomy 1992, tonsillectomy in the 1960s, wisdom teeth removal in 1974, dilatation and curettage in the 1980s  Social Hx:  Patient is single and works as a massage therapist and horticulturist. She is a nonsmoker. She has a glass of wine per night. She was previously on hormone replacement therapy since 2004 which was discontinued the time of her breast cancer diagnosis  Family Hx:  Mother with 4 different primary malignancies including colon cancer, lymphoma, lung cancer, recurrent lymphoma, and renal cell carcinoma      Chief Complaint    Joint pain      Interval History   Patient here for scheduled follow up visit. She continues on Femara daily as prescribed.  Her joints continue to bother her-- mostly her hands and hips.    Massage and accupuncture help, along with PT.  Hot flashes improved overall-- mild and intermittent.   + weight gain since being on Femara.   No fever, chills.  Recent URI around Xmas time-- since resolved.    No new GI/ issues.  No CP or palpitations.        Review of Systems   14 point review of systems positive as noted above in interval history.  Otherwise reviewed and unremarkable.           Health Status   Allergies:    Allergic Reactions (Selected)  Severity Not Documented  Bactrim- Photosensitivity.  Doxycycline- Rash.  Keflex- Water blisters all over.  Latex- Blister rash.  MSG- Migraine.  Penicillin- Blisters all over.  Nonallergic Reactions (Selected)  Severity Not Documented  Atropine- Heart beats fast.  Epinephrine- Heart beats fast.,    Allergies (8) Active Reaction  atropine heart beats fast  Bactrim photosensitivity  doxycycline rash  Epinephrine heart beats fast  Keflex water blisters all over  Latex blister rash  MSG migraine  penicillin blisters all over   Current medications:   (Selected)   Prescriptions  Prescribed  Femara 2.5 mg oral tablet: 2.5 mg = 1 tab(s), Oral, Daily, # 30 tab(s), 1 Refill(s), Pharmacy: [facility] [id number]  Vitamin D 50,000 intl units oral capsule: 50,000 IntUnit = 1 cap(s), Oral, qWeek, # 12 cap(s), 0 Refill(s), Pharmacy: [facility] [place] [id number]  letrozole 2.5 mg oral tablet: 2.5 mg = 1 tab(s), Oral, Daily, # 30 tab(s), 4 Refill(s), Pharmacy: [facility] [place] [id number]  Documented Medications  Documented  IBUPROFEN 800 MG TABLET: 800 mg = 1 tab(s), TID  Misc Rx Supply: 0 Refill(s)  magnesium oxide 500 mg oral tablet: 500 mg = 1 tab(s), Oral, At Bedtime  multivitamin with minerals (Adult Tab): 1 tab(s), Oral, Daily, # 30 tab(s), 0 Refill(s)      Physical Examination   Vital Signs   1/31/2019 10:58 CST      Peripheral Pulse Rate     89 bpm                             Systolic Blood Pressure   149 mmHg  HI                             Diastolic Blood Pressure  69 mmHg              General:  Alert and oriented, No acute distress.    Eye:  Extraocular movements are intact, Normal conjunctiva.    HENT:  Normocephalic, Oral mucosa is moist.    Neck:  Supple, No lymphadenopathy.    Respiratory:  Lungs are clear to auscultation, Respirations are non-labored, Breath sounds are equal.    Cardiovascular:  Normal rate, Regular rhythm, No edema.    Gastrointestinal:  Soft, Non-tender, Non-distended, Normal bowel sounds.    Integumentary:  Warm, Dry, Intact.    Neurologic:  Alert, Oriented, No focal deficits.    Psychiatric:  Cooperative, Appropriate mood & affect.    Breast:  Bilateral breast exam performed today with no suspicious masses or lymphedenopathy felt. Left breast with noted hyperpigmentation and tissues changes s/t radiation treatment- mildly tender to palpation .    Genitourinary:  No costovertebral angle tenderness.    Lymphatics:  No lymphadenopathy neck, axilla, groin.    Musculoskeletal:  No tenderness, No swelling, No deformity.    Cognition and  Speech:  Oriented, Speech clear and coherent.    ECOG Performance Scale: 0 - Fully active; no performance restrictions.      Review / Management   Results review:  Lab results   1/24/2019 13:12 CST      Sodium Lvl                138 mmol/L                             Potassium Lvl             5.1 mmol/L                             Chloride                  104 mmol/L                             CO2                       28.0 mmol/L                             Calcium Lvl               9.6 mg/dL                             Glucose Lvl               109 mg/dL  HI                             BUN                       15.0 mg/dL                             Creatinine                0.54 mg/dL  LOW                             eGFR-AA                   >60 mL/min/1.73 m2  NA                             eGFR-CARMELITA                  >60 mL/min/1.73 m2  NA                             Bili Total                0.4 mg/dL                             Bili Direct               0.10 mg/dL                             Bili Indirect             0.30 mg/dL                             AST                       8 unit/L  LOW                             ALT                       29 unit/L                             Alk Phos                  82 unit/L                             Total Protein             7.3 gm/dL                             Albumin Lvl               4.10 gm/dL                             Globulin                  3.20 gm/dL                             A/G Ratio                 1.3 ratio                             Vit D 25 OH               67.45 ng/mL                             CA 27.29                  16.4 unit/mL    1/24/2019 12:33 CST      WBC                       8.1 x10(3)/mcL                             RBC                       4.84 x10(6)/mcL                             Hgb                       13.2 gm/dL                             Hct                       42.0 %                             Platelet                   316 x10(3)/mcL                             MCV                       86.8 fL                             MCH                       27.3 pg                             MCHC                      31.4 gm/dL  LOW                             RDW                       13.7 %                             MPV                       10.3 fL                             Abs Neut                  4.84 x10(3)/mcL                             NEUT%                     60.0 %  NA                             NEUT#                     4.8 x10(3)/mcL                             LYMPH%                    28.7 %  NA                             LYMPH#                    2.3 x10(3)/mcL                             MONO%                     7.8 %  NA                             MONO#                     0.6 x10(3)/mcL                             EOS%                      2.8 %  NA                             EOS#                      0.2 x10(3)/mcL                             BASO%                     0.6 %  NA                             BASO#                     0.0 x10(3)/mcL  .       Impression and Plan   Diagnoses:  1. [id number] well differentiated IDC of the left breast, ER+ 95%/MN+ 95%/Cym0weu negative dx 1/25/18  2. Focal ductal hyperplasia of the left breast.   3. Fibrocystic changes of the left breast  4. Remote history of benign right breast lesion status post lumpectomy in 1995  5.  Intermittent/chronic leukocytosis--> resolved.   6.  Normal bone density at baseline  7. Vitamin D Deficiency--> now on weekly replacement therapy at 50,000 units.  8.  Aromatase inhibitor induced arthralgias and hot flashes        Plan:  Continue Femara 2.5mg as prescribed  Contineu Vitamin E  supplementation to help manage intermittent hot flashes  She will continue weekly vitamin D through her PCP   Bilateral MMG due 12/19  RTC 4 months with cbc, cmp, vit d level, and Ca 27-29 followed by visit with radha   Instructed to call clinic with  any questions or concerns       LYNN Bullard MD

## 2022-05-16 DIAGNOSIS — I34.0 MITRAL VALVE INSUFFICIENCY, UNSPECIFIED ETIOLOGY: ICD-10-CM

## 2022-05-16 DIAGNOSIS — R53.83 FATIGUE, UNSPECIFIED TYPE: ICD-10-CM

## 2022-05-16 DIAGNOSIS — C50.412 PRIMARY CANCER OF UPPER OUTER QUADRANT OF LEFT BREAST: ICD-10-CM

## 2022-05-16 DIAGNOSIS — E78.2 ELEVATED CHOLESTEROL WITH HIGH TRIGLYCERIDES: Primary | ICD-10-CM

## 2022-05-18 ENCOUNTER — LAB VISIT (OUTPATIENT)
Dept: LAB | Facility: HOSPITAL | Age: 67
End: 2022-05-18
Attending: INTERNAL MEDICINE
Payer: MEDICARE

## 2022-05-18 DIAGNOSIS — E78.2 ELEVATED CHOLESTEROL WITH HIGH TRIGLYCERIDES: ICD-10-CM

## 2022-05-18 DIAGNOSIS — C50.412 PRIMARY CANCER OF UPPER OUTER QUADRANT OF LEFT BREAST: ICD-10-CM

## 2022-05-18 DIAGNOSIS — I34.0 MITRAL VALVE INSUFFICIENCY, UNSPECIFIED ETIOLOGY: ICD-10-CM

## 2022-05-18 DIAGNOSIS — R53.83 FATIGUE, UNSPECIFIED TYPE: ICD-10-CM

## 2022-05-18 LAB
CHOLEST SERPL-MCNC: 229 MG/DL
CHOLEST/HDLC SERPL: 4 {RATIO} (ref 0–5)
HDLC SERPL-MCNC: 51 MG/DL (ref 35–60)
LDLC SERPL CALC-MCNC: 144 MG/DL (ref 50–140)
TRIGL SERPL-MCNC: 170 MG/DL (ref 37–140)
TSH SERPL-ACNC: 0.91 UIU/ML (ref 0.35–4.94)
VLDLC SERPL CALC-MCNC: 34 MG/DL

## 2022-05-18 PROCEDURE — 80061 LIPID PANEL: CPT

## 2022-05-18 PROCEDURE — 84443 ASSAY THYROID STIM HORMONE: CPT

## 2022-05-18 PROCEDURE — 36415 COLL VENOUS BLD VENIPUNCTURE: CPT

## 2022-05-23 ENCOUNTER — PATIENT OUTREACH (OUTPATIENT)
Dept: ADMINISTRATIVE | Facility: HOSPITAL | Age: 67
End: 2022-05-23
Payer: MEDICARE

## 2022-05-23 NOTE — PROGRESS NOTES
Population Health. Out Reach.  The following record(s)  below were uploaded for Health Maintenance .    1/6/22 MAMMOGRAM SCREENING            11/12/21 PAP SMEAR  9/9/20 DEXA SCREENING

## 2022-05-24 ENCOUNTER — PATIENT OUTREACH (OUTPATIENT)
Dept: ADMINISTRATIVE | Facility: HOSPITAL | Age: 67
End: 2022-05-24
Payer: MEDICARE

## 2022-05-24 ENCOUNTER — OFFICE VISIT (OUTPATIENT)
Dept: INTERNAL MEDICINE | Facility: CLINIC | Age: 67
End: 2022-05-24
Payer: MEDICARE

## 2022-05-24 VITALS
HEIGHT: 68 IN | BODY MASS INDEX: 34.1 KG/M2 | TEMPERATURE: 98 F | OXYGEN SATURATION: 97 % | HEART RATE: 87 BPM | SYSTOLIC BLOOD PRESSURE: 144 MMHG | WEIGHT: 225 LBS | DIASTOLIC BLOOD PRESSURE: 76 MMHG

## 2022-05-24 DIAGNOSIS — E55.9 VITAMIN D DEFICIENCY: ICD-10-CM

## 2022-05-24 DIAGNOSIS — M54.9 BACK PAIN, UNSPECIFIED BACK LOCATION, UNSPECIFIED BACK PAIN LATERALITY, UNSPECIFIED CHRONICITY: ICD-10-CM

## 2022-05-24 DIAGNOSIS — C50.419: ICD-10-CM

## 2022-05-24 DIAGNOSIS — M25.552 PAIN OF LEFT HIP JOINT: ICD-10-CM

## 2022-05-24 DIAGNOSIS — T14.8XXA PULLED MUSCLE: ICD-10-CM

## 2022-05-24 DIAGNOSIS — Z00.00 ENCOUNTER FOR WELLNESS EXAMINATION: Primary | ICD-10-CM

## 2022-05-24 PROBLEM — M54.2 NECK PAIN: Status: ACTIVE | Noted: 2022-05-24

## 2022-05-24 PROCEDURE — 99214 PR OFFICE/OUTPT VISIT, EST, LEVL IV, 30-39 MIN: ICD-10-PCS | Mod: ,,,

## 2022-05-24 PROCEDURE — 99214 OFFICE O/P EST MOD 30 MIN: CPT | Mod: ,,,

## 2022-05-24 RX ORDER — CYCLOBENZAPRINE HCL 5 MG
5 TABLET ORAL 3 TIMES DAILY PRN
Qty: 15 TABLET | Refills: 0 | Status: SHIPPED | OUTPATIENT
Start: 2022-05-24 | End: 2022-05-29

## 2022-05-24 RX ORDER — IBUPROFEN 800 MG/1
800 TABLET ORAL
COMMUNITY
Start: 2021-09-15 | End: 2022-05-25 | Stop reason: SDUPTHER

## 2022-05-24 RX ORDER — LATANOPROST 50 UG/ML
SOLUTION/ DROPS OPHTHALMIC
COMMUNITY
Start: 2022-04-07

## 2022-05-24 RX ORDER — MELOXICAM 15 MG/1
15 TABLET ORAL
COMMUNITY
Start: 2021-12-28 | End: 2022-05-24 | Stop reason: SDUPTHER

## 2022-05-24 RX ORDER — ACETIC ACID 20.65 MG/ML
4 SOLUTION AURICULAR (OTIC) 3 TIMES DAILY PRN
Qty: 6 ML | Refills: 0 | Status: SHIPPED | OUTPATIENT
Start: 2022-05-24 | End: 2022-06-03

## 2022-05-24 RX ORDER — ANASTROZOLE 1 MG/1
1 TABLET ORAL DAILY
COMMUNITY
Start: 2022-03-30 | End: 2022-08-12 | Stop reason: SDUPTHER

## 2022-05-24 RX ORDER — MELOXICAM 15 MG/1
15 TABLET ORAL DAILY PRN
Qty: 14 TABLET | Refills: 0 | Status: SHIPPED | OUTPATIENT
Start: 2022-05-24 | End: 2022-06-07

## 2022-05-24 RX ORDER — PHENOBARBITAL, HYOSCYAMINE SULFATE, ATROPINE SULFATE AND SCOPOLAMINE HYDROBROMIDE .0194; .1037; 16.2; .0065 MG/1; MG/1; MG/1; MG/1
1 TABLET ORAL DAILY PRN
Qty: 20 TABLET | Refills: 0 | Status: SHIPPED | OUTPATIENT
Start: 2022-05-24 | End: 2022-05-25 | Stop reason: SDUPTHER

## 2022-05-24 RX ORDER — ERGOCALCIFEROL 1.25 MG/1
1 CAPSULE ORAL
COMMUNITY
Start: 2021-05-04 | End: 2024-01-25
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
## 2022-05-24 NOTE — PROGRESS NOTES
Population Health Outreach.Records Received, hyper-linked into chart at this time. The following record(s)  below were uploaded for Health Maintenance .               9/17/2018 COLONOSCOPY

## 2022-05-25 DIAGNOSIS — M54.9 BACK PAIN, UNSPECIFIED BACK LOCATION, UNSPECIFIED BACK PAIN LATERALITY, UNSPECIFIED CHRONICITY: ICD-10-CM

## 2022-05-25 RX ORDER — PHENOBARBITAL, HYOSCYAMINE SULFATE, ATROPINE SULFATE AND SCOPOLAMINE HYDROBROMIDE .0194; .1037; 16.2; .0065 MG/1; MG/1; MG/1; MG/1
1 TABLET ORAL 3 TIMES DAILY PRN
COMMUNITY
End: 2022-05-25

## 2022-05-25 RX ORDER — PHENOBARBITAL, HYOSCYAMINE SULFATE, ATROPINE SULFATE AND SCOPOLAMINE HYDROBROMIDE .0194; .1037; 16.2; .0065 MG/1; MG/1; MG/1; MG/1
1 TABLET ORAL DAILY
Qty: 20 TABLET | Refills: 0 | Status: SHIPPED | OUTPATIENT
Start: 2022-05-25 | End: 2022-05-26 | Stop reason: SDUPTHER

## 2022-05-25 RX ORDER — IBUPROFEN 800 MG/1
800 TABLET ORAL EVERY 6 HOURS PRN
Qty: 30 TABLET | Refills: 1 | Status: SHIPPED | OUTPATIENT
Start: 2022-05-25 | End: 2022-09-01

## 2022-05-25 NOTE — ASSESSMENT & PLAN NOTE
-patient feeling generally well today  -wellness labs reviewed and essentially normal, elevated lipid panel  -age-appropriate screenings up-to-date  -immunizations up-to-date for those that werent declined  -encourage routine aerobic exercise 2 to 3 times a week  -increase fluid hydration

## 2022-05-25 NOTE — TELEPHONE ENCOUNTER
----- Message from Nancie Garcia sent at 5/25/2022  4:04 PM CDT -----  Regarding: trouble with refills  Patient left voicemail she is at [facility] and having trouble with scripts call her at [phone]

## 2022-05-26 RX ORDER — PHENOBARBITAL, HYOSCYAMINE SULFATE, ATROPINE SULFATE AND SCOPOLAMINE HYDROBROMIDE .0194; .1037; 16.2; .0065 MG/1; MG/1; MG/1; MG/1
1 TABLET ORAL DAILY
Qty: 20 TABLET | Refills: 0 | Status: SHIPPED | OUTPATIENT
Start: 2022-05-26 | End: 2023-05-30

## 2022-08-12 DIAGNOSIS — C50.419: ICD-10-CM

## 2022-08-12 RX ORDER — ANASTROZOLE 1 MG/1
1 TABLET ORAL DAILY
Qty: 90 TABLET | Refills: 1 | Status: SHIPPED | OUTPATIENT
Start: 2022-08-12 | End: 2023-03-17

## 2022-08-29 PROBLEM — Z00.00 ENCOUNTER FOR WELLNESS EXAMINATION: Status: RESOLVED | Noted: 2022-05-24 | Resolved: 2022-08-29

## 2022-09-08 ENCOUNTER — LAB VISIT (OUTPATIENT)
Dept: LAB | Facility: HOSPITAL | Age: 67
End: 2022-09-08
Payer: MEDICARE

## 2022-09-08 DIAGNOSIS — C77.9 MALIGNANT NEOPLASM METASTATIC TO LYMPH NODES, UNSPECIFIED LYMPH NODE REGION: ICD-10-CM

## 2022-09-08 DIAGNOSIS — M85.80 OSTEOPENIA, UNSPECIFIED LOCATION: ICD-10-CM

## 2022-09-08 DIAGNOSIS — C50.419: Primary | ICD-10-CM

## 2022-09-08 DIAGNOSIS — C50.419: ICD-10-CM

## 2022-09-08 LAB
ALBUMIN SERPL-MCNC: 4.3 GM/DL (ref 3.4–4.8)
ALBUMIN/GLOB SERPL: 1.4 RATIO (ref 1.1–2)
ALP SERPL-CCNC: 96 UNIT/L (ref 40–150)
ALT SERPL-CCNC: 32 UNIT/L (ref 0–55)
AST SERPL-CCNC: 20 UNIT/L (ref 5–34)
BASOPHILS # BLD AUTO: 0.07 X10(3)/MCL (ref 0–0.2)
BASOPHILS NFR BLD AUTO: 0.6 %
BILIRUBIN DIRECT+TOT PNL SERPL-MCNC: 0.3 MG/DL
BUN SERPL-MCNC: 17 MG/DL (ref 9.8–20.1)
CALCIUM SERPL-MCNC: 10 MG/DL (ref 8.4–10.2)
CHLORIDE SERPL-SCNC: 104 MMOL/L (ref 98–107)
CO2 SERPL-SCNC: 23 MMOL/L (ref 23–31)
CREAT SERPL-MCNC: 0.69 MG/DL (ref 0.55–1.02)
DEPRECATED CALCIDIOL+CALCIFEROL SERPL-MC: 41.3 NG/ML (ref 30–80)
EOSINOPHIL # BLD AUTO: 0.36 X10(3)/MCL (ref 0–0.9)
EOSINOPHIL NFR BLD AUTO: 3.1 %
ERYTHROCYTE [DISTWIDTH] IN BLOOD BY AUTOMATED COUNT: 13.4 % (ref 11.5–17)
GFR SERPLBLD CREATININE-BSD FMLA CKD-EPI: >60 MLS/MIN/1.73/M2
GLOBULIN SER-MCNC: 3 GM/DL (ref 2.4–3.5)
GLUCOSE SERPL-MCNC: 100 MG/DL (ref 82–115)
HCT VFR BLD AUTO: 42.5 % (ref 37–47)
HGB BLD-MCNC: 13.5 GM/DL (ref 12–16)
IMM GRANULOCYTES # BLD AUTO: 0.06 X10(3)/MCL (ref 0–0.04)
IMM GRANULOCYTES NFR BLD AUTO: 0.5 %
LYMPHOCYTES # BLD AUTO: 3.82 X10(3)/MCL (ref 0.6–4.6)
LYMPHOCYTES NFR BLD AUTO: 32.4 %
MCH RBC QN AUTO: 27.9 PG (ref 27–31)
MCHC RBC AUTO-ENTMCNC: 31.8 MG/DL (ref 33–36)
MCV RBC AUTO: 87.8 FL (ref 80–94)
MONOCYTES # BLD AUTO: 1.08 X10(3)/MCL (ref 0.1–1.3)
MONOCYTES NFR BLD AUTO: 9.2 %
NEUTROPHILS # BLD AUTO: 6.4 X10(3)/MCL (ref 2.1–9.2)
NEUTROPHILS NFR BLD AUTO: 54.2 %
PLATELET # BLD AUTO: 389 X10(3)/MCL (ref 130–400)
PMV BLD AUTO: 9.7 FL (ref 7.4–10.4)
POTASSIUM SERPL-SCNC: 4.8 MMOL/L (ref 3.5–5.1)
PROT SERPL-MCNC: 7.3 GM/DL (ref 5.8–7.6)
RBC # BLD AUTO: 4.84 X10(6)/MCL (ref 4.2–5.4)
SODIUM SERPL-SCNC: 136 MMOL/L (ref 136–145)
WBC # SPEC AUTO: 11.8 X10(3)/MCL (ref 4.5–11.5)

## 2022-09-08 PROCEDURE — 82306 VITAMIN D 25 HYDROXY: CPT

## 2022-09-08 PROCEDURE — 86300 IMMUNOASSAY TUMOR CA 15-3: CPT

## 2022-09-08 PROCEDURE — 85025 COMPLETE CBC W/AUTO DIFF WBC: CPT

## 2022-09-08 PROCEDURE — 36415 COLL VENOUS BLD VENIPUNCTURE: CPT

## 2022-09-08 PROCEDURE — 80053 COMPREHEN METABOLIC PANEL: CPT

## 2022-09-12 LAB — CANCER AG27-29 SERPL-ACNC: 18.7 U/ML

## 2022-09-15 ENCOUNTER — OFFICE VISIT (OUTPATIENT)
Dept: HEMATOLOGY/ONCOLOGY | Facility: CLINIC | Age: 67
End: 2022-09-15
Payer: MEDICARE

## 2022-09-15 VITALS
WEIGHT: 222.44 LBS | HEART RATE: 89 BPM | HEIGHT: 65 IN | SYSTOLIC BLOOD PRESSURE: 127 MMHG | RESPIRATION RATE: 18 BRPM | OXYGEN SATURATION: 97 % | BODY MASS INDEX: 37.06 KG/M2 | DIASTOLIC BLOOD PRESSURE: 67 MMHG | TEMPERATURE: 99 F

## 2022-09-15 DIAGNOSIS — Z17.0 MALIGNANT NEOPLASM OF UPPER-OUTER QUADRANT OF LEFT BREAST IN FEMALE, ESTROGEN RECEPTOR POSITIVE: Primary | ICD-10-CM

## 2022-09-15 DIAGNOSIS — M85.80 OSTEOPENIA, UNSPECIFIED LOCATION: ICD-10-CM

## 2022-09-15 DIAGNOSIS — Z78.0 POSTMENOPAUSAL: ICD-10-CM

## 2022-09-15 DIAGNOSIS — Z79.811 LONG TERM (CURRENT) USE OF AROMATASE INHIBITORS: ICD-10-CM

## 2022-09-15 DIAGNOSIS — M25.50 AROMATASE INHIBITOR-ASSOCIATED ARTHRALGIA: ICD-10-CM

## 2022-09-15 DIAGNOSIS — T45.1X5A AROMATASE INHIBITOR-ASSOCIATED ARTHRALGIA: ICD-10-CM

## 2022-09-15 DIAGNOSIS — C50.412 MALIGNANT NEOPLASM OF UPPER-OUTER QUADRANT OF LEFT BREAST IN FEMALE, ESTROGEN RECEPTOR POSITIVE: Primary | ICD-10-CM

## 2022-09-15 PROBLEM — I34.0 MITRAL VALVE INSUFFICIENCY: Status: ACTIVE | Noted: 2022-09-15

## 2022-09-15 PROBLEM — C77.9 MALIGNANT NEOPLASM METASTATIC TO LYMPH NODES: Status: ACTIVE | Noted: 2022-09-15

## 2022-09-15 PROBLEM — C50.419 MALIGNANT NEOPLASM OF UPPER-OUTER QUADRANT OF FEMALE BREAST: Status: ACTIVE | Noted: 2022-09-15

## 2022-09-15 PROBLEM — G62.9 NEUROPATHY: Status: ACTIVE | Noted: 2022-09-15

## 2022-09-15 PROCEDURE — 99214 PR OFFICE/OUTPT VISIT, EST, LEVL IV, 30-39 MIN: ICD-10-PCS | Mod: S$PBB,,, | Performed by: NURSE PRACTITIONER

## 2022-09-15 PROCEDURE — 99999 PR PBB SHADOW E&M-EST. PATIENT-LVL IV: ICD-10-PCS | Mod: PBBFAC,,, | Performed by: NURSE PRACTITIONER

## 2022-09-15 PROCEDURE — 99214 OFFICE O/P EST MOD 30 MIN: CPT | Mod: S$PBB,,, | Performed by: NURSE PRACTITIONER

## 2022-09-15 PROCEDURE — 99999 PR PBB SHADOW E&M-EST. PATIENT-LVL IV: CPT | Mod: PBBFAC,,, | Performed by: NURSE PRACTITIONER

## 2022-09-15 PROCEDURE — 99214 OFFICE O/P EST MOD 30 MIN: CPT | Mod: PBBFAC | Performed by: NURSE PRACTITIONER

## 2022-09-15 NOTE — PROGRESS NOTES
Subjective:       Patient ID: Arianna Rizvi is a 67 y.o. female.    Chief Complaint: Follow-up (Patient stated that she has been having more hip trouble)      Diagnosis:  1. [id number] well differentiated IDC of the left breast, ER+ 95%/DE+ 95%/Mkx4fhc negative dx 1/25/18.  Oncotype DX score came back at 17 placing her in the low risk category  2. Focal ductal hyperplasia of the left breast.   3. Fibrocystic changes of the left breast  4. Remote history of benign right breast lesion status post lumpectomy in 1995  5. Chronic leukocytosis, since 2004  6. Normal bone density at baseline; Repeat on 9/9/2020 showed osteopenia bilateral hips  7. Vitamin D Deficiency-- now on weekly therapy.     Current Treatment:   Switched from Femara to anastrozole on 4/6/2020.  This was due to intolerable side effects to Femara.  Vitamin E daily  Ca/Vit D    Treatment History:  1.  Left breast lumpectomy and sentinel lymph node biopsy performed February 21, 2018--> pathology showed 1.5 cm primary tumor, well-differentiated with Ki-67 score of 32% and one sentinel lymph node with micrometastases (>0.2mm but < 2mm)  2.  Adjvant radiation therapy 4/30/18- 6/11/18   3.  Femara 2.5 mg p.o. daily started June 2018.    HPI  This is a very pleasant 67-year-old female kindly referred by Dr. Hutchison for further evaluation workup following lumpectomy for well-differentiated infiltrating ductal carcinoma the left breast. The patient's history is remarkable for routine screening mammography performed December 7, 2017 which revealed a suspicious 1 cm mass with indistinct margins in the upper outer quadrant of the left breast.  She then required additional views of the left breast with ultrasound performed December 14, 2017 which revealed irregular shaped hypoechoic mass at the 3 o'clock position of the left breast 3 cm from the nipple measuring 1.7 x 1.7 x 2 cm. The patient was then set up for ultrasound guided vacuum-assisted needle core biopsy  done January 25, 2018. Pathology came back showing well-differentiated infiltrating ductal carcinoma, ER positive at 95%, ME +95%, and HER-2/rosalino negative. Ki-67 score of 32%.  The patient was referred to Dr. Hutchison and underwent preoperative breast MRI performed Fabry 14 2018. This revealed a 2 cm lobulated spiculated enhancing mass at the 3 o'clock position of the left breast. No significant or concerning internal mammary or axillary adenopathy. No other suspicious enhancing lesions in either breast.  The patient was then taken to the operating room on February 21, 2018 and underwent lumpectomy with sentinel lymph node biopsy of the left breast. Pathology revealed a 1.5 cm primary tumor well-differentiated , with clear margins. Micrometastases and one sentinel lymph node as described above. Pathologic T1c N1(mi).  The patient recovered very well from surgery and was then referred to medical oncology.  Oncotype DX assay sent off --> came back with score of 17 placing her in the low risk category for recurrence.  Baseline DEXA scan performed April 3, 2018 shows normal bone density with T score of 2.5 in the AP spine and -1.0 in the right hip and -0.1 in the left hip.  In addition, workup for leukocytosis on April 16, 2018 showed no evidence of any dysplastic cells on peripheral smear.  HIV and hepatitis panels negative/nonreactive.  LDH normal at 145.  WBC had come back down to normal as well at 9.8.  S/p completion of adjuvant radiation therapy 4/30/18- 6/11/18.  She underwent mammogram of the left breast done June 27, 2018 which showed BI-RADS 2 with recommendation for routine screening mammogram December 2018 of her bilateral breasts.  The patient agreed to start on aromatase inhibitor therapy after patient education was started on Femara towards the end of June 2018.  Patient was treated with Femara until 4/6/2020, at that time switch to anastrozole due to intolerance to Femara.     **Most recent DEXA scan on  9/9/2020 showed osteopenia of the left femoral neck with a T score of -1.4, right femoral neck with a T score of -1.4, total left femur with a T score of -1.4.  **Patient has undergone routine mammograms, most recently on 1/6/2022 showing no evidence of disease.    Interval History:   Patient is here today for a follow-up for breast cancer.  She remains on anastrozole and continues to have arthralgias with it.  She denies any areas pain, no headaches.      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 Scot Chavis    dental implants      HYSTERECTOMY      LUMPECTOMY,BREAST, WITH RADIOACTIVE SEED LOCALIZATION AND SENTINEL LYMPH NODE BIOPSY Left 02/21/2018    TONSILLECTOMY       Social History     Socioeconomic History    Marital status: Single   Tobacco Use    Smoking status: Never    Smokeless tobacco: Never   Substance and Sexual Activity    Alcohol use: Not Currently    Drug use: Never      Family History   Family history unknown: Yes      Review of patient's allergies indicates:   Allergen Reactions    Atropine      Other reaction(s): heart beats fast    Epinephrine      Other reaction(s): heart beats fast    Cephalexin Rash     Other reaction(s): water blisters all over  Blisters      Doxycycline Rash    Latex Rash     blisters    Penicillins Rash     Other reaction(s): blisters all over  blisters      Sulfamethoxazole-trimethoprim Photosensitivity      Review of Systems   Constitutional:  Negative for activity change, appetite change, chills, diaphoresis, fatigue, fever and unexpected weight change.   HENT:  Negative for mouth sores.    Respiratory:  Negative for cough and shortness of breath.    Cardiovascular:  Negative for chest pain,  palpitations and leg swelling.   Gastrointestinal:  Negative for abdominal pain, blood in stool, change in bowel habit, constipation, diarrhea, nausea, vomiting and change in bowel habit.   Endocrine: Negative for cold intolerance.   Genitourinary:  Negative for dysuria, hematuria and hot flashes.   Musculoskeletal:  Positive for arthralgias. Negative for myalgias.   Integumentary:  Negative for rash.   Allergic/Immunologic: Negative for immunocompromised state.   Neurological:  Negative for dizziness, weakness, light-headedness, numbness and headaches.   Hematological:  Negative for adenopathy. Does not bruise/bleed easily.   Psychiatric/Behavioral:  Negative for confusion.        Objective:      Physical Exam  Vitals reviewed.   Constitutional:       General: She is awake. She is not in acute distress.     Appearance: Normal appearance. She is well-groomed.   HENT:      Head: Normocephalic.   Eyes:      General: Lids are normal. No scleral icterus.     Extraocular Movements: Extraocular movements intact.      Conjunctiva/sclera: Conjunctivae normal.   Cardiovascular:      Rate and Rhythm: Normal rate and regular rhythm.   Pulmonary:      Effort: Pulmonary effort is normal.      Breath sounds: Normal breath sounds.   Chest:      Chest wall: No tenderness.   Breasts:     Right: No mass, skin change or tenderness.      Left: Tenderness present. No mass or skin change.      Comments: Tenderness to the lumpectomy site  Abdominal:      General: Bowel sounds are normal. There is no distension.      Palpations: Abdomen is soft.      Tenderness: There is no abdominal tenderness. There is no guarding.   Musculoskeletal:         General: No swelling, tenderness or deformity.      Cervical back: Full passive range of motion without pain and neck supple. No tenderness.      Right lower leg: No edema.      Left lower leg: No edema.   Lymphadenopathy:      Cervical: No cervical adenopathy.      Upper Body:      Right upper body:  No supraclavicular or axillary adenopathy.      Left upper body: No supraclavicular or axillary adenopathy.   Skin:     General: Skin is warm and dry.      Coloration: Skin is not jaundiced or pale.   Neurological:      General: No focal deficit present.      Mental Status: She is alert and oriented to person, place, and time.      Motor: No weakness.      Gait: Gait is intact.   Psychiatric:         Mood and Affect: Mood normal.         Speech: Speech normal.         Behavior: Behavior normal. Behavior is cooperative.       LABS AND IMAGING REVIEWED IN EPIC          Assessment:   1. 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 Focal ductal hyperplasia of the left breast.   3. Fibrocystic changes of the left breast  4. Remote history of benign right breast lesion status post lumpectomy in 1995  5.  Intermittent/chronic leukocytosis--> resolved.   6.  Normal bone density at baseline; repeat DEXA 09/2020 showed osteopenia of bilateral hips  7. Vitamin D Deficiency--> now on weekly replacement therapy at 50,000 units.  8.  Aromatase inhibitor induced arthralgias and hot flashes  9.  Osteopenia        Plan:     -We will continue with anastrozole as she seems to be tolerating this better than the letrozole.  Offered to try exemestane but she is not interested at this time.     -Previous discussed possibility of Prolia.  She does have spinal stenosis in her cervical spine leading to numbness in her fingers.  She is concerned that starting Prolia may cause bone reformation in that area and lead to worsening stenosis.  She will be seeking a second opinion for possible surgery.  Due to the fact that her T score was -1.4, I feel that it is okay if she discontinues with calcium and vitamin D.  repeat DEXA due this month, will order today.     -Continue taking Ca/Vit D daily      -Her CA 27-29 is within normal limits.     -[facility]  [facility]     -Continue with outpatient therapy for chronic back pain     -Continue with acupuncture as needed.     -Return to clinic in 6 months for OV and clinical examination     -Labs: CBC, CMP, Vitamin D Level, CA 27.29     Instructed to call clinic if her side effects from her medication get to be intolerable.     All questions were answered to the best of my ability and the patient understands the plan moving forward.          Claudine Solorzano, [facility]-C

## 2022-10-19 ENCOUNTER — HOSPITAL ENCOUNTER (OUTPATIENT)
Dept: RADIOLOGY | Facility: HOSPITAL | Age: 67
Discharge: HOME | End: 2022-10-19
Attending: NURSE PRACTITIONER
Payer: MEDICARE

## 2022-10-19 DIAGNOSIS — Z78.0 POSTMENOPAUSAL: ICD-10-CM

## 2022-10-19 DIAGNOSIS — C50.412 MALIGNANT NEOPLASM OF UPPER-OUTER QUADRANT OF LEFT BREAST IN FEMALE, ESTROGEN RECEPTOR POSITIVE: ICD-10-CM

## 2022-10-19 DIAGNOSIS — M85.80 OSTEOPENIA, UNSPECIFIED LOCATION: ICD-10-CM

## 2022-10-19 DIAGNOSIS — Z17.0 MALIGNANT NEOPLASM OF UPPER-OUTER QUADRANT OF LEFT BREAST IN FEMALE, ESTROGEN RECEPTOR POSITIVE: ICD-10-CM

## 2022-10-19 PROCEDURE — 77080 DXA BONE DENSITY AXIAL: CPT | Mod: TC

## 2022-10-19 PROCEDURE — 77080 DEXA BONE DENSITY SPINE HIP: ICD-10-PCS | Mod: 26,,, | Performed by: RADIOLOGY

## 2022-10-19 PROCEDURE — 77080 DXA BONE DENSITY AXIAL: CPT | Mod: 26,,, | Performed by: RADIOLOGY

## 2023-03-07 ENCOUNTER — LAB VISIT (OUTPATIENT)
Dept: LAB | Facility: HOSPITAL | Age: 68
End: 2023-03-07
Attending: INTERNAL MEDICINE
Payer: MEDICARE

## 2023-03-07 DIAGNOSIS — C50.412 MALIGNANT NEOPLASM OF UPPER-OUTER QUADRANT OF LEFT BREAST IN FEMALE, ESTROGEN RECEPTOR POSITIVE: ICD-10-CM

## 2023-03-07 DIAGNOSIS — Z17.0 MALIGNANT NEOPLASM OF UPPER-OUTER QUADRANT OF LEFT BREAST IN FEMALE, ESTROGEN RECEPTOR POSITIVE: ICD-10-CM

## 2023-03-07 LAB
ALBUMIN SERPL-MCNC: 4.2 G/DL (ref 3.4–4.8)
ALBUMIN/GLOB SERPL: 1.4 RATIO (ref 1.1–2)
ALP SERPL-CCNC: 88 UNIT/L (ref 40–150)
ALT SERPL-CCNC: 18 UNIT/L (ref 0–55)
AST SERPL-CCNC: 12 UNIT/L (ref 5–34)
BASOPHILS # BLD AUTO: 0.07 X10(3)/MCL (ref 0–0.2)
BASOPHILS NFR BLD AUTO: 0.6 %
BILIRUBIN DIRECT+TOT PNL SERPL-MCNC: 0.3 MG/DL
BUN SERPL-MCNC: 15.7 MG/DL (ref 9.8–20.1)
CALCIUM SERPL-MCNC: 9.9 MG/DL (ref 8.4–10.2)
CEA SERPL-MCNC: 1.82 NG/ML (ref 0–3)
CHLORIDE SERPL-SCNC: 104 MMOL/L (ref 98–107)
CO2 SERPL-SCNC: 24 MMOL/L (ref 23–31)
CREAT SERPL-MCNC: 0.71 MG/DL (ref 0.55–1.02)
EOSINOPHIL # BLD AUTO: 0.28 X10(3)/MCL (ref 0–0.9)
EOSINOPHIL NFR BLD AUTO: 2.2 %
ERYTHROCYTE [DISTWIDTH] IN BLOOD BY AUTOMATED COUNT: 13.2 % (ref 11.5–17)
GFR SERPLBLD CREATININE-BSD FMLA CKD-EPI: >60 MLS/MIN/1.73/M2
GLOBULIN SER-MCNC: 2.9 GM/DL (ref 2.4–3.5)
GLUCOSE SERPL-MCNC: 95 MG/DL (ref 82–115)
HCT VFR BLD AUTO: 41.7 % (ref 37–47)
HGB BLD-MCNC: 13 G/DL (ref 12–16)
IMM GRANULOCYTES # BLD AUTO: 0.03 X10(3)/MCL (ref 0–0.04)
IMM GRANULOCYTES NFR BLD AUTO: 0.2 %
LYMPHOCYTES # BLD AUTO: 3.39 X10(3)/MCL (ref 0.6–4.6)
LYMPHOCYTES NFR BLD AUTO: 27.1 %
MCH RBC QN AUTO: 28 PG
MCHC RBC AUTO-ENTMCNC: 31.2 G/DL (ref 33–36)
MCV RBC AUTO: 89.7 FL (ref 80–94)
MONOCYTES # BLD AUTO: 1.14 X10(3)/MCL (ref 0.1–1.3)
MONOCYTES NFR BLD AUTO: 9.1 %
NEUTROPHILS # BLD AUTO: 7.62 X10(3)/MCL (ref 2.1–9.2)
NEUTROPHILS NFR BLD AUTO: 60.8 %
PLATELET # BLD AUTO: 340 X10(3)/MCL (ref 130–400)
PMV BLD AUTO: 9.9 FL (ref 7.4–10.4)
POTASSIUM SERPL-SCNC: 4.7 MMOL/L (ref 3.5–5.1)
PROT SERPL-MCNC: 7.1 GM/DL (ref 5.8–7.6)
RBC # BLD AUTO: 4.65 X10(6)/MCL (ref 4.2–5.4)
SODIUM SERPL-SCNC: 139 MMOL/L (ref 136–145)
WBC # SPEC AUTO: 12.5 X10(3)/MCL (ref 4.5–11.5)

## 2023-03-07 PROCEDURE — 85025 COMPLETE CBC W/AUTO DIFF WBC: CPT

## 2023-03-07 PROCEDURE — 36415 COLL VENOUS BLD VENIPUNCTURE: CPT

## 2023-03-07 PROCEDURE — 80053 COMPREHEN METABOLIC PANEL: CPT

## 2023-03-07 PROCEDURE — 86300 IMMUNOASSAY TUMOR CA 15-3: CPT

## 2023-03-07 PROCEDURE — 82378 CARCINOEMBRYONIC ANTIGEN: CPT

## 2023-03-08 LAB — CANCER AG27-29 SERPL-ACNC: 13.2 U/ML

## 2023-03-12 DIAGNOSIS — C50.419: ICD-10-CM

## 2023-03-15 ENCOUNTER — OFFICE VISIT (OUTPATIENT)
Dept: HEMATOLOGY/ONCOLOGY | Facility: CLINIC | Age: 68
End: 2023-03-15
Payer: MEDICARE

## 2023-03-15 VITALS
WEIGHT: 230.63 LBS | HEIGHT: 68 IN | HEART RATE: 87 BPM | RESPIRATION RATE: 18 BRPM | BODY MASS INDEX: 34.95 KG/M2 | TEMPERATURE: 99 F | SYSTOLIC BLOOD PRESSURE: 138 MMHG | DIASTOLIC BLOOD PRESSURE: 81 MMHG | OXYGEN SATURATION: 97 %

## 2023-03-15 DIAGNOSIS — C50.419: Primary | ICD-10-CM

## 2023-03-15 DIAGNOSIS — C77.3 MALIGNANT NEOPLASM METASTATIC TO LYMPH NODE OF AXILLA: ICD-10-CM

## 2023-03-15 PROCEDURE — 99214 PR OFFICE/OUTPT VISIT, EST, LEVL IV, 30-39 MIN: ICD-10-PCS | Mod: S$PBB,,, | Performed by: INTERNAL MEDICINE

## 2023-03-15 PROCEDURE — 99999 PR PBB SHADOW E&M-EST. PATIENT-LVL III: CPT | Mod: PBBFAC,,, | Performed by: INTERNAL MEDICINE

## 2023-03-15 PROCEDURE — 99213 OFFICE O/P EST LOW 20 MIN: CPT | Mod: PBBFAC | Performed by: INTERNAL MEDICINE

## 2023-03-15 PROCEDURE — 99999 PR PBB SHADOW E&M-EST. PATIENT-LVL III: ICD-10-PCS | Mod: PBBFAC,,, | Performed by: INTERNAL MEDICINE

## 2023-03-15 PROCEDURE — 99214 OFFICE O/P EST MOD 30 MIN: CPT | Mod: S$PBB,,, | Performed by: INTERNAL MEDICINE

## 2023-03-15 NOTE — PROGRESS NOTES
Subjective:       Patient ID: Arianna Rizvi is a 68 y.o. female.    Chief Complaint: Breast Cancer (Pt reports she is complaint with anastrazole and has hot flashes and joint pain.)        Diagnosis:  1. 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  Oncotype DX score came back at 17 placing her in the low risk category  2. Focal ductal hyperplasia of the left breast.   3. Fibrocystic changes of the left breast  4. Remote history of benign right breast lesion status post lumpectomy in 1995  5. Chronic leukocytosis, since 2004  6. Normal bone density at baseline; Repeat on 9/9/2020 showed osteopenia bilateral hips  7. Vitamin D Deficiency-- now on weekly therapy.     Current Treatment:   Switched from Femara to anastrozole on 4/6/2020.  This was due to intolerable side effects to Femara.  Vitamin E daily  Ca/Vit D    Treatment History:  1.  Left breast lumpectomy and sentinel lymph node biopsy performed February 21, 2018--> pathology showed 1.5 cm primary tumor, well-differentiated with Ki-67 score of 32% and one sentinel lymph node with micrometastases (>0.2mm but < 2mm)  2.  Adjvant radiation therapy 4/30/18- 6/11/18   3.  Femara 2.5 mg p.o. daily started June 2018.    HPI:  This is a very pleasant 68-year-old female kindly referred by Dr. Hutchison for further evaluation workup following lumpectomy for well-differentiated infiltrating ductal carcinoma the left breast. The patient's history is remarkable for routine screening mammography performed December 7, 2017 which revealed a suspicious 1 cm mass with indistinct margins in the upper outer quadrant of the left breast.  She then required additional views of the left breast with ultrasound performed December 14, 2017 which revealed irregular shaped hypoechoic mass at the 3 o'clock position of the left breast 3 cm from the nipple measuring 1.7 x 1.7 x 2 cm. The patient was then set up for ultrasound guided  vacuum-assisted needle core biopsy done January 25, 2018. Pathology came back showing well-differentiated infiltrating ductal carcinoma, ER positive at 95%, PA +95%, and HER-2/rosalino negative. Ki-67 score of 32%.  The patient was referred to Dr. Hutchison and underwent preoperative breast MRI performed Fabry 14 2018. This revealed a 2 cm lobulated spiculated enhancing mass at the 3 o'clock position of the left breast. No significant or concerning internal mammary or axillary adenopathy. No other suspicious enhancing lesions in either breast.  The patient was then taken to the operating room on February 21, 2018 and underwent lumpectomy with sentinel lymph node biopsy of the left breast. Pathology revealed a 1.5 cm primary tumor well-differentiated , with clear margins. Micrometastases and one sentinel lymph node as described above. Pathologic T1c N1(mi).  The patient recovered very well from surgery and was then referred to medical oncology.  Oncotype DX assay sent off --> came back with score of 17 placing her in the low risk category for recurrence.  Baseline DEXA scan performed April 3, 2018 shows normal bone density with T score of 2.5 in the AP spine and -1.0 in the right hip and -0.1 in the left hip.  In addition, workup for leukocytosis on April 16, 2018 showed no evidence of any dysplastic cells on peripheral smear.  HIV and hepatitis panels negative/nonreactive.  LDH normal at 145.  WBC had come back down to normal as well at 9.8.  S/p completion of adjuvant radiation therapy 4/30/18- 6/11/18.  She underwent mammogram of the left breast done June 27, 2018 which showed BI-RADS 2 with recommendation for routine screening mammogram December 2018 of her bilateral breasts.  The patient agreed to start on aromatase inhibitor therapy after patient education was started on Femara towards the end of June 2018.  Patient was treated with Femara until 4/6/2020, at that time switch to anastrozole due to intolerance to  Femara.     **Most recent DEXA scan on 10/09/2022 showed osteopenia.  **Patient has undergone routine mammograms, most recently on 01/11/2023 showing no evidence of disease.    Interval History:   Patient is here today for a follow-up for breast cancer.  She remains on anastrozole and continues to have arthralgias with it.  She denies any areas pain, no headaches.  She is happy that she will be finishing up her aromatase inhibitor on 06/01/2023.      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 Scot Chavis    dental implants      HYSTERECTOMY      LUMPECTOMY,BREAST, WITH RADIOACTIVE SEED LOCALIZATION AND SENTINEL LYMPH NODE BIOPSY Left 02/21/2018    TONSILLECTOMY       Social History     Socioeconomic History    Marital status: Single   Tobacco Use    Smoking status: Never    Smokeless tobacco: Never   Substance and Sexual Activity    Alcohol use: Not Currently    Drug use: Never      Family History   Family history unknown: Yes      Review of patient's allergies indicates:   Allergen Reactions    Atropine      Other reaction(s): heart beats fast    Epinephrine      Other reaction(s): heart beats fast    Cephalexin Rash     Other reaction(s): water blisters all over  Blisters      Doxycycline Rash    Latex Rash     blisters    Penicillins Rash     Other reaction(s): blisters all over  blisters      Sulfamethoxazole-trimethoprim Photosensitivity      Review of Systems   Constitutional:  Negative for activity change, appetite change, chills, diaphoresis, fatigue, fever and unexpected weight change.   HENT:  Negative for mouth sores.    Respiratory:  Negative for cough and shortness of breath.    Cardiovascular:  Negative for chest pain,  palpitations and leg swelling.   Gastrointestinal:  Negative for abdominal pain, blood in stool, change in bowel habit, constipation, diarrhea, nausea, vomiting and change in bowel habit.   Endocrine: Negative for cold intolerance.   Genitourinary:  Negative for dysuria, hematuria and hot flashes.   Musculoskeletal:  Positive for arthralgias. Negative for myalgias.   Integumentary:  Negative for rash.   Allergic/Immunologic: Negative for immunocompromised state.   Neurological:  Negative for dizziness, weakness, light-headedness, numbness and headaches.   Hematological:  Negative for adenopathy. Does not bruise/bleed easily.   Psychiatric/Behavioral:  Negative for confusion.        Objective:      Physical Exam  Vitals reviewed.   Constitutional:       General: She is awake. She is not in acute distress.     Appearance: Normal appearance. She is well-groomed.   HENT:      Head: Normocephalic.   Eyes:      General: Lids are normal. No scleral icterus.     Extraocular Movements: Extraocular movements intact.      Conjunctiva/sclera: Conjunctivae normal.   Cardiovascular:      Rate and Rhythm: Normal rate and regular rhythm.   Pulmonary:      Effort: Pulmonary effort is normal.      Breath sounds: Normal breath sounds.   Chest:      Chest wall: No tenderness.   Breasts:     Right: No mass, skin change or tenderness.      Left: Tenderness present. No mass or skin change.      Comments: Tenderness to the lumpectomy site  Abdominal:      General: Bowel sounds are normal. There is no distension.      Palpations: Abdomen is soft.      Tenderness: There is no abdominal tenderness. There is no guarding.   Musculoskeletal:         General: No swelling, tenderness or deformity.      Cervical back: Full passive range of motion without pain and neck supple. No tenderness.      Right lower leg: No edema.      Left lower leg: No edema.   Lymphadenopathy:      Cervical: No cervical adenopathy.      Upper Body:      Right upper body:  No supraclavicular or axillary adenopathy.      Left upper body: No supraclavicular or axillary adenopathy.   Skin:     General: Skin is warm and dry.      Coloration: Skin is not jaundiced or pale.   Neurological:      General: No focal deficit present.      Mental Status: She is alert and oriented to person, place, and time.      Motor: No weakness.      Gait: Gait is intact.   Psychiatric:         Mood and Affect: Mood normal.         Speech: Speech normal.         Behavior: Behavior normal. Behavior is cooperative.       LABS AND IMAGING REVIEWED IN EPIC          Assessment:   1. 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 Focal ductal hyperplasia of the left breast.   3. Fibrocystic changes of the left breast  4. Remote history of benign right breast lesion status post lumpectomy in 1995  5.  Intermittent/chronic leukocytosis--> resolved.   6.  Normal bone density at baseline; repeat DEXA 09/2020 showed osteopenia of bilateral hips  7. Vitamin D Deficiency--> now on weekly replacement therapy at 50,000 units.  8.  Aromatase inhibitor induced arthralgias and hot flashes  9.  Osteopenia        Plan:       We will continue with anastrozole as she seems to be tolerating this better than the letrozole.  Offered to try exemestane but she is not interested at this time.  She will complete therapy on 06/01/2023.     Previous discussed possibility of Prolia.  She does have spinal stenosis in her cervical spine leading to numbness in her fingers.  She is concerned that starting Prolia may cause bone reformation in that area and lead to worsening stenosis.  She will be seeking a second opinion for possible surgery.  Due to the fact that her T score was -1.4, I feel that it is okay if she discontinues with calcium and vitamin D.  repeat DEXA due this month, will order today.     Continue taking Ca/Vit D daily      Her CA 27-29 is within normal limits.     [facility]     Continue with outpatient therapy for chronic back pain     Continue with acupuncture as needed.     Return to clinic in 6 months for OV and clinical examination     Labs: CBC, CMP, Vitamin D Level, CA 27.29 and CEA     All questions were answered to the best of my ability and the patient understands the plan moving forward.          Kaden Chapa II, MD

## 2023-03-17 RX ORDER — ANASTROZOLE 1 MG/1
TABLET ORAL
Qty: 90 TABLET | Refills: 2 | Status: SHIPPED | OUTPATIENT
Start: 2023-03-17

## 2023-05-18 DIAGNOSIS — Z00.00 WELLNESS EXAMINATION: Primary | ICD-10-CM

## 2023-05-22 ENCOUNTER — TELEPHONE (OUTPATIENT)
Dept: ADMINISTRATIVE | Facility: HOSPITAL | Age: 68
End: 2023-05-22
Payer: MEDICARE

## 2023-05-22 NOTE — TELEPHONE ENCOUNTER
----- Message from Mariel España MA sent at 5/18/2023  7:54 AM CDT -----  Regarding: Dr LIYA BRAXTON Tuesday 5-30-23       1. Are there any outstanding Labs, imaging or referrals in the patient's chart?      Wellness Appointment    Fasting wellness labs ordered and ready to do.     Last Wellness 5-24-22           2. . Has the patient been seen in an ER, urgent care clinic, or any other health care    provider since their last visit? If yes when and where?

## 2023-05-23 ENCOUNTER — TELEPHONE (OUTPATIENT)
Dept: INTERNAL MEDICINE | Facility: CLINIC | Age: 68
End: 2023-05-23
Payer: MEDICARE

## 2023-05-23 ENCOUNTER — LAB VISIT (OUTPATIENT)
Dept: LAB | Facility: HOSPITAL | Age: 68
End: 2023-05-23
Attending: INTERNAL MEDICINE
Payer: MEDICARE

## 2023-05-23 DIAGNOSIS — Z00.00 WELLNESS EXAMINATION: ICD-10-CM

## 2023-05-23 LAB
CHOLEST SERPL-MCNC: 222 MG/DL
CHOLEST/HDLC SERPL: 4 {RATIO} (ref 0–5)
HDLC SERPL-MCNC: 53 MG/DL (ref 35–60)
LDLC SERPL CALC-MCNC: 140 MG/DL (ref 50–140)
TRIGL SERPL-MCNC: 144 MG/DL (ref 37–140)
TSH SERPL-ACNC: 1.2 UIU/ML (ref 0.35–4.94)
VLDLC SERPL CALC-MCNC: 29 MG/DL

## 2023-05-23 PROCEDURE — 80061 LIPID PANEL: CPT

## 2023-05-23 PROCEDURE — 84443 ASSAY THYROID STIM HORMONE: CPT

## 2023-05-23 PROCEDURE — 36415 COLL VENOUS BLD VENIPUNCTURE: CPT

## 2023-05-30 ENCOUNTER — OFFICE VISIT (OUTPATIENT)
Dept: INTERNAL MEDICINE | Facility: CLINIC | Age: 68
End: 2023-05-30
Payer: MEDICARE

## 2023-05-30 VITALS
WEIGHT: 229 LBS | BODY MASS INDEX: 34.71 KG/M2 | OXYGEN SATURATION: 98 % | HEIGHT: 68 IN | TEMPERATURE: 98 F | DIASTOLIC BLOOD PRESSURE: 72 MMHG | SYSTOLIC BLOOD PRESSURE: 132 MMHG | HEART RATE: 98 BPM

## 2023-05-30 DIAGNOSIS — E66.09 CLASS 1 OBESITY DUE TO EXCESS CALORIES WITH SERIOUS COMORBIDITY AND BODY MASS INDEX (BMI) OF 34.0 TO 34.9 IN ADULT: ICD-10-CM

## 2023-05-30 DIAGNOSIS — Z00.00 MEDICARE ANNUAL WELLNESS VISIT, SUBSEQUENT: ICD-10-CM

## 2023-05-30 DIAGNOSIS — Z17.0 MALIGNANT NEOPLASM OF UPPER-OUTER QUADRANT OF LEFT BREAST IN FEMALE, ESTROGEN RECEPTOR POSITIVE: Primary | ICD-10-CM

## 2023-05-30 DIAGNOSIS — C50.412 MALIGNANT NEOPLASM OF UPPER-OUTER QUADRANT OF LEFT BREAST IN FEMALE, ESTROGEN RECEPTOR POSITIVE: Primary | ICD-10-CM

## 2023-05-30 PROBLEM — E66.811 CLASS 1 OBESITY DUE TO EXCESS CALORIES WITH BODY MASS INDEX (BMI) OF 34.0 TO 34.9 IN ADULT: Status: ACTIVE | Noted: 2023-05-30

## 2023-05-30 PROCEDURE — G0439 PR MEDICARE ANNUAL WELLNESS SUBSEQUENT VISIT: ICD-10-PCS | Mod: ,,, | Performed by: INTERNAL MEDICINE

## 2023-05-30 PROCEDURE — G0439 PPPS, SUBSEQ VISIT: HCPCS | Mod: ,,, | Performed by: INTERNAL MEDICINE

## 2023-05-30 RX ORDER — MULTIVITAMIN
1 TABLET ORAL DAILY
COMMUNITY
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
## 2023-05-30 NOTE — ASSESSMENT & PLAN NOTE

## 2023-05-30 NOTE — ASSESSMENT & PLAN NOTE
-doing well, on anastrozole 1 mg p.o. daily, continue  -followed by Oncology closely   -status post lumpectomy and XRT

## 2023-09-04 PROBLEM — Z00.00 MEDICARE ANNUAL WELLNESS VISIT, SUBSEQUENT: Status: RESOLVED | Noted: 2022-05-24 | Resolved: 2023-09-04

## 2023-09-13 ENCOUNTER — LAB VISIT (OUTPATIENT)
Dept: LAB | Facility: HOSPITAL | Age: 68
End: 2023-09-13
Attending: INTERNAL MEDICINE
Payer: MEDICARE

## 2023-09-13 DIAGNOSIS — C77.3 MALIGNANT NEOPLASM METASTATIC TO LYMPH NODE OF AXILLA: ICD-10-CM

## 2023-09-13 DIAGNOSIS — C50.419: ICD-10-CM

## 2023-09-13 LAB
ALBUMIN SERPL-MCNC: 4 G/DL (ref 3.4–4.8)
ALBUMIN/GLOB SERPL: 1.4 RATIO (ref 1.1–2)
ALP SERPL-CCNC: 95 UNIT/L (ref 40–150)
ALT SERPL-CCNC: 20 UNIT/L (ref 0–55)
AST SERPL-CCNC: 12 UNIT/L (ref 5–34)
BASOPHILS # BLD AUTO: 0.06 X10(3)/MCL
BASOPHILS NFR BLD AUTO: 0.5 %
BILIRUB SERPL-MCNC: 0.5 MG/DL
BUN SERPL-MCNC: 10.8 MG/DL (ref 9.8–20.1)
CALCIUM SERPL-MCNC: 9.6 MG/DL (ref 8.4–10.2)
CEA SERPL-MCNC: <1.73 NG/ML (ref 0–3)
CHLORIDE SERPL-SCNC: 105 MMOL/L (ref 98–107)
CO2 SERPL-SCNC: 25 MMOL/L (ref 23–31)
CREAT SERPL-MCNC: 0.66 MG/DL (ref 0.55–1.02)
EOSINOPHIL # BLD AUTO: 0.31 X10(3)/MCL (ref 0–0.9)
EOSINOPHIL NFR BLD AUTO: 2.5 %
ERYTHROCYTE [DISTWIDTH] IN BLOOD BY AUTOMATED COUNT: 13 % (ref 11.5–17)
GFR SERPLBLD CREATININE-BSD FMLA CKD-EPI: >60 MLS/MIN/1.73/M2
GLOBULIN SER-MCNC: 2.9 GM/DL (ref 2.4–3.5)
GLUCOSE SERPL-MCNC: 105 MG/DL (ref 82–115)
HCT VFR BLD AUTO: 41.2 % (ref 37–47)
HGB BLD-MCNC: 13.2 G/DL (ref 12–16)
IMM GRANULOCYTES # BLD AUTO: 0.11 X10(3)/MCL (ref 0–0.04)
IMM GRANULOCYTES NFR BLD AUTO: 0.9 %
LYMPHOCYTES # BLD AUTO: 3.52 X10(3)/MCL (ref 0.6–4.6)
LYMPHOCYTES NFR BLD AUTO: 27.8 %
MCH RBC QN AUTO: 27.7 PG (ref 27–31)
MCHC RBC AUTO-ENTMCNC: 32 G/DL (ref 33–36)
MCV RBC AUTO: 86.6 FL (ref 80–94)
MONOCYTES # BLD AUTO: 1.06 X10(3)/MCL (ref 0.1–1.3)
MONOCYTES NFR BLD AUTO: 8.4 %
NEUTROPHILS # BLD AUTO: 7.58 X10(3)/MCL (ref 2.1–9.2)
NEUTROPHILS NFR BLD AUTO: 59.9 %
PLATELET # BLD AUTO: 355 X10(3)/MCL (ref 130–400)
PMV BLD AUTO: 9.8 FL (ref 7.4–10.4)
POTASSIUM SERPL-SCNC: 4.7 MMOL/L (ref 3.5–5.1)
PROT SERPL-MCNC: 6.9 GM/DL (ref 5.8–7.6)
RBC # BLD AUTO: 4.76 X10(6)/MCL (ref 4.2–5.4)
SODIUM SERPL-SCNC: 139 MMOL/L (ref 136–145)
WBC # SPEC AUTO: 12.64 X10(3)/MCL (ref 4.5–11.5)

## 2023-09-13 PROCEDURE — 85025 COMPLETE CBC W/AUTO DIFF WBC: CPT

## 2023-09-13 PROCEDURE — 80053 COMPREHEN METABOLIC PANEL: CPT

## 2023-09-13 PROCEDURE — 82378 CARCINOEMBRYONIC ANTIGEN: CPT

## 2023-09-13 PROCEDURE — 36415 COLL VENOUS BLD VENIPUNCTURE: CPT

## 2023-09-20 NOTE — PROGRESS NOTES
Subjective:       Patient ID: Arianna Rizvi is a 68 y.o. female.    Chief Complaint: No chief complaint on file.        Diagnosis:  [id number] well differentiated IDC of the left breast, ER+ 95%/KY+ 95%/Zpm5ter negative dx 1/25/18.  Oncotype DX score came back at 17 placing her in the low risk category  Focal ductal hyperplasia of the left breast.    Fibrocystic changes of the left breast  Remote history of benign right breast lesion status post lumpectomy in 1995  Chronic leukocytosis, since 2004  Normal bone density at baseline; Repeat on 9/9/2020 showed osteopenia bilateral hips  Vitamin D Deficiency-- now on weekly therapy.     Current Treatment:   Switched from Femara to anastrozole on 4/6/2020.  This was due to intolerable side effects to Femara.  Vitamin E daily  Ca/Vit D    Treatment History:  Left breast lumpectomy and sentinel lymph node biopsy performed February 21, 2018--> pathology showed 1.5 cm primary tumor, well-differentiated with Ki-67 score of 32% and one sentinel lymph node with micrometastases (>0.2mm but < 2mm)  Adjvant radiation therapy 4/30/18- 6/11/18   Femara 2.5 mg p.o. daily started June 2018.    HPI:  This is a very pleasant 68-year-old female kindly referred by Dr. Hutchison for further evaluation workup following lumpectomy for well-differentiated infiltrating ductal carcinoma the left breast. The patient's history is remarkable for routine screening mammography performed December 7, 2017 which revealed a suspicious 1 cm mass with indistinct margins in the upper outer quadrant of the left breast.  She then required additional views of the left breast with ultrasound performed December 14, 2017 which revealed irregular shaped hypoechoic mass at the 3 o'clock position of the left breast 3 cm from the nipple measuring 1.7 x 1.7 x 2 cm. The patient was then set up for ultrasound guided vacuum-assisted needle core biopsy done January 25, 2018. Pathology came back showing well-differentiated  infiltrating ductal carcinoma, ER positive at 95%, DE +95%, and HER-2/rosalino negative. Ki-67 score of 32%.  The patient was referred to Dr. Hutchison and underwent preoperative breast MRI performed Fabry 14 2018. This revealed a 2 cm lobulated spiculated enhancing mass at the 3 o'clock position of the left breast. No significant or concerning internal mammary or axillary adenopathy. No other suspicious enhancing lesions in either breast.  The patient was then taken to the operating room on February 21, 2018 and underwent lumpectomy with sentinel lymph node biopsy of the left breast. Pathology revealed a 1.5 cm primary tumor well-differentiated , with clear margins. Micrometastases and one sentinel lymph node as described above. Pathologic T1c N1(mi).  The patient recovered very well from surgery and was then referred to medical oncology.  Oncotype DX assay sent off --> came back with score of 17 placing her in the low risk category for recurrence.  Baseline DEXA scan performed April 3, 2018 shows normal bone density with T score of 2.5 in the AP spine and -1.0 in the right hip and -0.1 in the left hip.  In addition, workup for leukocytosis on April 16, 2018 showed no evidence of any dysplastic cells on peripheral smear.  HIV and hepatitis panels negative/nonreactive.  LDH normal at 145.  WBC had come back down to normal as well at 9.8.  S/p completion of adjuvant radiation therapy 4/30/18- 6/11/18.  She underwent mammogram of the left breast done June 27, 2018 which showed BI-RADS 2 with recommendation for routine screening mammogram December 2018 of her bilateral breasts.  The patient agreed to start on aromatase inhibitor therapy after patient education was started on Femara towards the end of June 2018.  Patient was treated with Femara until 4/6/2020, at that time switch to anastrozole due to intolerance to Femara.  Related adjuvant endocrine therapy in June of 2023.     **Most recent DEXA scan on 10/09/2022 showed  osteopenia.  **Patient has undergone routine mammograms, most recently on 01/11/2023 showing no evidence of disease.    Interval History:   Patient is here today for a follow-up for breast cancer.  She has completed anastrozole in June of 2023.  She comes in today, she states that since stopping her medicine, she has had some persistent fatigue.  She also states to have a low-grade fever of about .  She also has some left breast discomfort.  She also noticed that her white blood cell count was still elevated.      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 Scot Chavis    dental implants      HYSTERECTOMY      LUMPECTOMY,BREAST, WITH RADIOACTIVE SEED LOCALIZATION AND SENTINEL LYMPH NODE BIOPSY Left 02/21/2018    TONSILLECTOMY       Social History     Socioeconomic History    Marital status: Single   Tobacco Use    Smoking status: Never    Smokeless tobacco: Never   Substance and Sexual Activity    Alcohol use: Not Currently    Drug use: Never      Family History   Family history unknown: Yes      Review of patient's allergies indicates:   Allergen Reactions    Atropine      Other reaction(s): heart beats fast    Epinephrine      Other reaction(s): heart beats fast    Cephalexin Rash     Other reaction(s): water blisters all over  Blisters      Doxycycline Rash    Latex Rash     blisters    Penicillins Rash     Other reaction(s): blisters all over  blisters      Sulfamethoxazole-trimethoprim Photosensitivity      Review of Systems   Constitutional:  Positive for fatigue and fever (Low-grade fever per patient). Negative for activity change, appetite change, chills, diaphoresis and unexpected weight change.   HENT:  Negative for mouth  sores.    Respiratory:  Negative for cough and shortness of breath.    Cardiovascular:  Negative for chest pain, palpitations and leg swelling.   Gastrointestinal:  Negative for abdominal pain, blood in stool, change in bowel habit, constipation, diarrhea, nausea and vomiting.   Endocrine: Negative for cold intolerance.   Genitourinary:  Negative for dysuria, hematuria and hot flashes.   Musculoskeletal:  Positive for arthralgias. Negative for myalgias.   Integumentary:  Positive for breast tenderness (left upper outer quadrant). Negative for rash.   Allergic/Immunologic: Negative for immunocompromised state.   Neurological:  Negative for dizziness, weakness, light-headedness, numbness and headaches.   Hematological:  Negative for adenopathy. Does not bruise/bleed easily.   Psychiatric/Behavioral:  Negative for confusion.    Breast: Positive for tenderness (left upper outer quadrant).        Objective:      Physical Exam  Vitals reviewed.   Constitutional:       General: She is awake. She is not in acute distress.     Appearance: Normal appearance. She is well-groomed.   HENT:      Head: Normocephalic.   Eyes:      General: Lids are normal. No scleral icterus.     Extraocular Movements: Extraocular movements intact.      Conjunctiva/sclera: Conjunctivae normal.   Cardiovascular:      Rate and Rhythm: Normal rate and regular rhythm.   Pulmonary:      Effort: Pulmonary effort is normal.      Breath sounds: Normal breath sounds.   Chest:      Chest wall: No tenderness.   Breasts:     Right: No mass, skin change or tenderness.      Left: Tenderness present. No mass or skin change.      Comments: Tenderness to the lumpectomy site.  There was a small knot in the left upper outer quadrant  Abdominal:      General: Bowel sounds are normal. There is no distension.      Palpations: Abdomen is soft.      Tenderness: There is no abdominal tenderness. There is no guarding.   Musculoskeletal:         General: No swelling,  tenderness or deformity.      Cervical back: Full passive range of motion without pain and neck supple. No tenderness.      Right lower leg: No edema.      Left lower leg: No edema.   Lymphadenopathy:      Cervical: No cervical adenopathy.      Upper Body:      Right upper body: No supraclavicular or axillary adenopathy.      Left upper body: No supraclavicular or axillary adenopathy.   Skin:     General: Skin is warm and dry.      Coloration: Skin is not jaundiced or pale.   Neurological:      General: No focal deficit present.      Mental Status: She is alert and oriented to person, place, and time.      Motor: No weakness.      Gait: Gait is intact.   Psychiatric:         Mood and Affect: Mood normal.         Speech: Speech normal.         Behavior: Behavior normal. Behavior is cooperative.         LABS AND IMAGING REVIEWED IN EPIC          Assessment:   [id number] well differentiated IDC of the left breast, ER+ 95%/DC+ 95%/[id number] negative dx 1/25/18  Focal ductal hyperplasia of the left breast.   Fibrocystic changes of the left breast  Remote history of benign right breast lesion status post lumpectomy in 1995  Intermittent/chronic leukocytosis--> resolved.   Normal bone density at baseline; repeat DEXA 09/2020 showed osteopenia of bilateral hips   Vitamin D Deficiency--> now on weekly replacement therapy at 50,000 units.  Aromatase inhibitor induced arthralgias and hot flashes  Osteopenia      Plan:       We will continue with anastrozole as she seems to be tolerating this better than the letrozole.  Offered to try exemestane but she is not interested at this time.  She will complete therapy on 06/01/2023.     Previous discussed possibility of Prolia.  She does have spinal stenosis in her cervical spine leading to numbness in her fingers.  She is concerned that starting Prolia may cause bone reformation in that area and lead to worsening stenosis.  She will be seeking a second opinion for possible surgery.  Due  to the fact that her T score was -1.4, I feel that it is okay if she discontinues with calcium and vitamin D.  repeat DEXA due this month, will order today.     Continue taking Ca/Vit D daily      Her CA 27-29 has been within normal limits, drawn again today.     [facility]     Continue with outpatient therapy for chronic back pain     Continue with acupuncture as needed.    Left breast ultrasound ordered at the [facility].    Flow cytometry, blood cultures, and BCR/ABL checked today, 9/21/2023.    She will return to clinic in 6 weeks to review results of blood work and ultrasound.  If everything is negative, will move back to 6 month visits with labs     Labs: CBC, CMP, Vitamin D Level, CA 27.29 and CEA     All questions were answered to the best of my ability and the patient understands the plan moving forward.          Kaden Chapa II, MD

## 2023-09-21 ENCOUNTER — OFFICE VISIT (OUTPATIENT)
Dept: HEMATOLOGY/ONCOLOGY | Facility: CLINIC | Age: 68
End: 2023-09-21
Payer: MEDICARE

## 2023-09-21 VITALS
BODY MASS INDEX: 34.48 KG/M2 | SYSTOLIC BLOOD PRESSURE: 130 MMHG | WEIGHT: 227.5 LBS | HEIGHT: 68 IN | RESPIRATION RATE: 18 BRPM | HEART RATE: 96 BPM | DIASTOLIC BLOOD PRESSURE: 79 MMHG | OXYGEN SATURATION: 98 % | TEMPERATURE: 98 F

## 2023-09-21 DIAGNOSIS — C50.419: Primary | ICD-10-CM

## 2023-09-21 DIAGNOSIS — R50.9 RECENT UNEXPLAINED FEVER: ICD-10-CM

## 2023-09-21 DIAGNOSIS — D47.1 CHRONIC MYELOPROLIFERATIVE DISEASE: ICD-10-CM

## 2023-09-21 DIAGNOSIS — R53.83 FATIGUE, UNSPECIFIED TYPE: ICD-10-CM

## 2023-09-21 DIAGNOSIS — N63.0 BREAST NODULE: ICD-10-CM

## 2023-09-21 DIAGNOSIS — R61 NIGHT SWEATS: ICD-10-CM

## 2023-09-21 DIAGNOSIS — C77.3 MALIGNANT NEOPLASM METASTATIC TO LYMPH NODE OF AXILLA: ICD-10-CM

## 2023-09-21 DIAGNOSIS — N64.4 BREAST PAIN: ICD-10-CM

## 2023-09-21 DIAGNOSIS — D72.829 LEUKOCYTOSIS, UNSPECIFIED TYPE: ICD-10-CM

## 2023-09-21 PROCEDURE — 99214 PR OFFICE/OUTPT VISIT, EST, LEVL IV, 30-39 MIN: ICD-10-PCS | Mod: S$PBB,,, | Performed by: INTERNAL MEDICINE

## 2023-09-21 PROCEDURE — 99214 OFFICE O/P EST MOD 30 MIN: CPT | Mod: S$PBB,,, | Performed by: INTERNAL MEDICINE

## 2023-09-21 PROCEDURE — 99999 PR PBB SHADOW E&M-EST. PATIENT-LVL IV: ICD-10-PCS | Mod: PBBFAC,,, | Performed by: INTERNAL MEDICINE

## 2023-09-21 PROCEDURE — 86300 IMMUNOASSAY TUMOR CA 15-3: CPT | Performed by: INTERNAL MEDICINE

## 2023-09-21 PROCEDURE — 99214 OFFICE O/P EST MOD 30 MIN: CPT | Mod: PBBFAC | Performed by: INTERNAL MEDICINE

## 2023-09-21 PROCEDURE — 88184 FLOWCYTOMETRY/ TC 1 MARKER: CPT

## 2023-09-21 PROCEDURE — 87040 BLOOD CULTURE FOR BACTERIA: CPT | Mod: 91 | Performed by: INTERNAL MEDICINE

## 2023-09-21 PROCEDURE — 36415 COLL VENOUS BLD VENIPUNCTURE: CPT | Performed by: INTERNAL MEDICINE

## 2023-09-21 PROCEDURE — 88185 FLOWCYTOMETRY/TC ADD-ON: CPT | Mod: 59

## 2023-09-21 PROCEDURE — 99999 PR PBB SHADOW E&M-EST. PATIENT-LVL IV: CPT | Mod: PBBFAC,,, | Performed by: INTERNAL MEDICINE

## 2023-09-22 ENCOUNTER — TELEPHONE (OUTPATIENT)
Dept: HEMATOLOGY/ONCOLOGY | Facility: CLINIC | Age: 68
End: 2023-09-22
Payer: MEDICARE

## 2023-09-22 DIAGNOSIS — C50.419: Primary | ICD-10-CM

## 2023-09-22 DIAGNOSIS — R53.83 FATIGUE, UNSPECIFIED TYPE: ICD-10-CM

## 2023-09-22 DIAGNOSIS — C77.3 MALIGNANT NEOPLASM METASTATIC TO LYMPH NODE OF AXILLA: ICD-10-CM

## 2023-09-22 DIAGNOSIS — R50.9 RECENT UNEXPLAINED FEVER: ICD-10-CM

## 2023-09-22 DIAGNOSIS — R61 NIGHT SWEATS: ICD-10-CM

## 2023-09-22 DIAGNOSIS — D72.829 LEUKOCYTOSIS, UNSPECIFIED TYPE: ICD-10-CM

## 2023-09-22 DIAGNOSIS — D47.1 CHRONIC MYELOPROLIFERATIVE DISEASE: ICD-10-CM

## 2023-09-25 LAB
BCR/ABL1 KINASE DOMAIN MUT ANL BLD/T: NORMAL
PATH REPORT.FINAL DX SPEC: NORMAL
SPECIMEN SOURCE: NORMAL

## 2023-09-26 LAB
BACTERIA BLD CULT: NORMAL
BACTERIA BLD CULT: NORMAL
GENETICIST REVIEW: NORMAL
M BCR/ABL1 P190 RESULT: NORMAL
SPECIMEN SOURCE: NORMAL

## 2023-09-27 LAB — CANCER AG27-29 SERPL-ACNC: 14.4 U/ML

## 2023-10-30 ENCOUNTER — HOSPITAL ENCOUNTER (OUTPATIENT)
Dept: RADIOLOGY | Facility: HOSPITAL | Age: 68
Discharge: HOME | End: 2023-10-30
Attending: INTERNAL MEDICINE
Payer: MEDICARE

## 2023-10-30 DIAGNOSIS — R53.83 FATIGUE, UNSPECIFIED TYPE: ICD-10-CM

## 2023-10-30 DIAGNOSIS — C50.419: ICD-10-CM

## 2023-10-30 DIAGNOSIS — R50.9 RECENT UNEXPLAINED FEVER: ICD-10-CM

## 2023-10-30 DIAGNOSIS — D47.1 CHRONIC MYELOPROLIFERATIVE DISEASE: ICD-10-CM

## 2023-10-30 DIAGNOSIS — R61 NIGHT SWEATS: ICD-10-CM

## 2023-10-30 DIAGNOSIS — D72.829 LEUKOCYTOSIS, UNSPECIFIED TYPE: ICD-10-CM

## 2023-10-30 DIAGNOSIS — C77.3 MALIGNANT NEOPLASM METASTATIC TO LYMPH NODE OF AXILLA: ICD-10-CM

## 2023-10-30 LAB
CREAT SERPL-MCNC: 0.5 MG/DL (ref 0.5–1.4)
SAMPLE: NORMAL

## 2023-10-30 PROCEDURE — 25500020 PHARM REV CODE 255: Performed by: INTERNAL MEDICINE

## 2023-10-30 PROCEDURE — 74177 CT ABD & PELVIS W/CONTRAST: CPT | Mod: TC

## 2023-10-30 RX ADMIN — IOPAMIDOL 100 ML: 755 INJECTION, SOLUTION INTRAVENOUS at 11:10

## 2023-11-07 ENCOUNTER — LAB VISIT (OUTPATIENT)
Dept: LAB | Facility: HOSPITAL | Age: 68
End: 2023-11-07
Attending: INTERNAL MEDICINE
Payer: MEDICARE

## 2023-11-07 ENCOUNTER — OFFICE VISIT (OUTPATIENT)
Dept: HEMATOLOGY/ONCOLOGY | Facility: CLINIC | Age: 68
End: 2023-11-07
Payer: MEDICARE

## 2023-11-07 VITALS
DIASTOLIC BLOOD PRESSURE: 84 MMHG | BODY MASS INDEX: 35.3 KG/M2 | SYSTOLIC BLOOD PRESSURE: 174 MMHG | HEIGHT: 68 IN | HEART RATE: 83 BPM | OXYGEN SATURATION: 98 % | RESPIRATION RATE: 18 BRPM | TEMPERATURE: 99 F | WEIGHT: 232.94 LBS

## 2023-11-07 DIAGNOSIS — C77.3 MALIGNANT NEOPLASM METASTATIC TO LYMPH NODE OF AXILLA: ICD-10-CM

## 2023-11-07 DIAGNOSIS — N64.4 BREAST PAIN: ICD-10-CM

## 2023-11-07 DIAGNOSIS — C50.419: Primary | ICD-10-CM

## 2023-11-07 DIAGNOSIS — Z12.31 SCREENING MAMMOGRAM FOR BREAST CANCER: ICD-10-CM

## 2023-11-07 DIAGNOSIS — C50.419: ICD-10-CM

## 2023-11-07 DIAGNOSIS — M85.80 OSTEOPENIA, UNSPECIFIED LOCATION: ICD-10-CM

## 2023-11-07 DIAGNOSIS — D72.829 LEUKOCYTOSIS, UNSPECIFIED TYPE: ICD-10-CM

## 2023-11-07 LAB
BASOPHILS # BLD AUTO: 0.07 X10(3)/MCL
BASOPHILS NFR BLD AUTO: 0.7 %
EOSINOPHIL # BLD AUTO: 0.36 X10(3)/MCL (ref 0–0.9)
EOSINOPHIL NFR BLD AUTO: 3.6 %
ERYTHROCYTE [DISTWIDTH] IN BLOOD BY AUTOMATED COUNT: 13.7 % (ref 11.5–17)
HCT VFR BLD AUTO: 42.3 % (ref 37–47)
HGB BLD-MCNC: 13.1 G/DL (ref 12–16)
IMM GRANULOCYTES # BLD AUTO: 0.03 X10(3)/MCL (ref 0–0.04)
IMM GRANULOCYTES NFR BLD AUTO: 0.3 %
LYMPHOCYTES # BLD AUTO: 3.25 X10(3)/MCL (ref 0.6–4.6)
LYMPHOCYTES NFR BLD AUTO: 32.9 %
MCH RBC QN AUTO: 27.6 PG (ref 27–31)
MCHC RBC AUTO-ENTMCNC: 31 G/DL (ref 33–36)
MCV RBC AUTO: 89.1 FL (ref 80–94)
MONOCYTES # BLD AUTO: 0.74 X10(3)/MCL (ref 0.1–1.3)
MONOCYTES NFR BLD AUTO: 7.5 %
NEUTROPHILS # BLD AUTO: 5.43 X10(3)/MCL (ref 2.1–9.2)
NEUTROPHILS NFR BLD AUTO: 55 %
PLATELET # BLD AUTO: 342 X10(3)/MCL (ref 130–400)
PMV BLD AUTO: 9.7 FL (ref 7.4–10.4)
RBC # BLD AUTO: 4.75 X10(6)/MCL (ref 4.2–5.4)
WBC # SPEC AUTO: 9.88 X10(3)/MCL (ref 4.5–11.5)

## 2023-11-07 PROCEDURE — 85025 COMPLETE CBC W/AUTO DIFF WBC: CPT

## 2023-11-07 PROCEDURE — 99999 PR PBB SHADOW E&M-EST. PATIENT-LVL V: CPT | Mod: PBBFAC,,, | Performed by: INTERNAL MEDICINE

## 2023-11-07 PROCEDURE — 99214 OFFICE O/P EST MOD 30 MIN: CPT | Mod: S$PBB,,, | Performed by: INTERNAL MEDICINE

## 2023-11-07 PROCEDURE — 99215 OFFICE O/P EST HI 40 MIN: CPT | Mod: PBBFAC | Performed by: INTERNAL MEDICINE

## 2023-11-07 PROCEDURE — 36415 COLL VENOUS BLD VENIPUNCTURE: CPT

## 2023-11-07 PROCEDURE — 99999 PR PBB SHADOW E&M-EST. PATIENT-LVL V: ICD-10-PCS | Mod: PBBFAC,,, | Performed by: INTERNAL MEDICINE

## 2023-11-07 PROCEDURE — 99214 PR OFFICE/OUTPT VISIT, EST, LEVL IV, 30-39 MIN: ICD-10-PCS | Mod: S$PBB,,, | Performed by: INTERNAL MEDICINE

## 2023-11-07 NOTE — PROGRESS NOTES
Subjective:       Patient ID: Arianna Rizvi is a 68 y.o. female.    Chief Complaint: Follow-up (Patient has a spot on right side of face she is concerned about. )        Diagnosis:  [id number] well differentiated IDC of the left breast, ER+ 95%/OH+ 95%/Zdd3bsc negative dx 1/25/18.  Oncotype DX score came back at 17 placing her in the low risk category  Focal ductal hyperplasia of the left breast.    Fibrocystic changes of the left breast  Remote history of benign right breast lesion status post lumpectomy in 1995  Chronic leukocytosis, since 2004  Normal bone density at baseline; Repeat on 9/9/2020 showed osteopenia bilateral hips  Vitamin D Deficiency-- now on weekly therapy.     Current Treatment:   Switched from Femara to anastrozole on 4/6/2020.  This was due to intolerable side effects to Femara.  Vitamin E daily  Ca/Vit D    Treatment History:  Left breast lumpectomy and sentinel lymph node biopsy performed February 21, 2018--> pathology showed 1.5 cm primary tumor, well-differentiated with Ki-67 score of 32% and one sentinel lymph node with micrometastases (>0.2mm but < 2mm)  Adjvant radiation therapy 4/30/18- 6/11/18   Femara 2.5 mg p.o. daily started June 2018.    HPI:  This is a very pleasant 68-year-old female kindly referred by Dr. Hutchison for further evaluation workup following lumpectomy for well-differentiated infiltrating ductal carcinoma the left breast. The patient's history is remarkable for routine screening mammography performed December 7, 2017 which revealed a suspicious 1 cm mass with indistinct margins in the upper outer quadrant of the left breast.  She then required additional views of the left breast with ultrasound performed December 14, 2017 which revealed irregular shaped hypoechoic mass at the 3 o'clock position of the left breast 3 cm from the nipple measuring 1.7 x 1.7 x 2 cm. The patient was then set up for ultrasound guided vacuum-assisted needle core biopsy done January 25,  2018. Pathology came back showing well-differentiated infiltrating ductal carcinoma, ER positive at 95%, NE +95%, and HER-2/rosalino negative. Ki-67 score of 32%.  The patient was referred to Dr. Hutchison and underwent preoperative breast MRI performed Fabry 14 2018. This revealed a 2 cm lobulated spiculated enhancing mass at the 3 o'clock position of the left breast. No significant or concerning internal mammary or axillary adenopathy. No other suspicious enhancing lesions in either breast.  The patient was then taken to the operating room on February 21, 2018 and underwent lumpectomy with sentinel lymph node biopsy of the left breast. Pathology revealed a 1.5 cm primary tumor well-differentiated , with clear margins. Micrometastases and one sentinel lymph node as described above. Pathologic T1c N1(mi).  The patient recovered very well from surgery and was then referred to medical oncology.  Oncotype DX assay sent off --> came back with score of 17 placing her in the low risk category for recurrence.  Baseline DEXA scan performed April 3, 2018 shows normal bone density with T score of 2.5 in the AP spine and -1.0 in the right hip and -0.1 in the left hip.  In addition, workup for leukocytosis on April 16, 2018 showed no evidence of any dysplastic cells on peripheral smear.  HIV and hepatitis panels negative/nonreactive.  LDH normal at 145.  WBC had come back down to normal as well at 9.8.  S/p completion of adjuvant radiation therapy 4/30/18- 6/11/18.  She underwent mammogram of the left breast done June 27, 2018 which showed BI-RADS 2 with recommendation for routine screening mammogram December 2018 of her bilateral breasts.  The patient agreed to start on aromatase inhibitor therapy after patient education was started on Femara towards the end of June 2018.  Patient was treated with Femara until 4/6/2020, at that time switch to anastrozole due to intolerance to Femara.  Completed adjuvant endocrine therapy in June of  2023.  BCR/ABL p190 and p210 drawn on 09/21/2023 both negative.  CT scan of the abdomen and pelvis with contrast on 10/30/2023 showed diverticulosis with no acute inflammation and no acute abnormalities in the abdomen or pelvis.     **Most recent DEXA scan on 10/09/2022 showed osteopenia.  **Patient has undergone routine mammograms, most recently on 01/11/2023 showing no evidence of disease.    Interval History:   Patient is here today for a follow-up for breast cancer.  She is also here to follow up her scans and labs that we did at her last visit.  Everything returned within normal limits.  She states that she was feeling better.        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 Scot Chavis    dental implants      HYSTERECTOMY      LUMPECTOMY,BREAST, WITH RADIOACTIVE SEED LOCALIZATION AND SENTINEL LYMPH NODE BIOPSY Left 02/21/2018    TONSILLECTOMY       Social History     Socioeconomic History    Marital status: Single   Tobacco Use    Smoking status: Never    Smokeless tobacco: Never   Substance and Sexual Activity    Alcohol use: Not Currently    Drug use: Never      Family History   Family history unknown: Yes      Review of patient's allergies indicates:   Allergen Reactions    Atropine      Other reaction(s): heart beats fast    Epinephrine      Other reaction(s): heart beats fast    Cephalexin Rash     Other reaction(s): water blisters all over  Blisters      Doxycycline Rash    Latex Rash     blisters    Penicillins Rash     Other reaction(s): blisters all over  blisters      Sulfamethoxazole-trimethoprim Photosensitivity      Review of Systems   Constitutional:  Positive for fatigue and fever (Low-grade fever per patient).  Negative for activity change, appetite change, chills, diaphoresis and unexpected weight change.   HENT:  Negative for mouth sores.    Respiratory:  Negative for cough and shortness of breath.    Cardiovascular:  Negative for chest pain, palpitations and leg swelling.   Gastrointestinal:  Negative for abdominal pain, blood in stool, change in bowel habit, constipation, diarrhea, nausea and vomiting.   Endocrine: Negative for cold intolerance.   Genitourinary:  Negative for dysuria, hematuria and hot flashes.   Musculoskeletal:  Positive for arthralgias. Negative for myalgias.   Integumentary:  Positive for breast tenderness (left upper outer quadrant). Negative for rash.   Allergic/Immunologic: Negative for immunocompromised state.   Neurological:  Negative for dizziness, weakness, light-headedness, numbness and headaches.   Hematological:  Negative for adenopathy. Does not bruise/bleed easily.   Psychiatric/Behavioral:  Negative for confusion.    Breast: Positive for tenderness (left upper outer quadrant).        Objective:      Physical Exam  Vitals reviewed.   Constitutional:       General: She is awake. She is not in acute distress.     Appearance: Normal appearance. She is well-groomed.   HENT:      Head: Normocephalic.   Eyes:      General: Lids are normal. No scleral icterus.     Extraocular Movements: Extraocular movements intact.      Conjunctiva/sclera: Conjunctivae normal.   Cardiovascular:      Rate and Rhythm: Normal rate and regular rhythm.   Pulmonary:      Effort: Pulmonary effort is normal.      Breath sounds: Normal breath sounds.   Chest:      Chest wall: No tenderness.   Breasts:     Right: No mass, skin change or tenderness.      Left: Tenderness present. No mass or skin change.      Comments: Tenderness to the lumpectomy site.  There was a small knot in the left upper outer quadrant  Abdominal:      General: Bowel sounds are normal. There is no distension.      Palpations: Abdomen is soft.       Tenderness: There is no abdominal tenderness. There is no guarding.   Musculoskeletal:         General: No swelling, tenderness or deformity.      Cervical back: Full passive range of motion without pain and neck supple. No tenderness.      Right lower leg: No edema.      Left lower leg: No edema.   Lymphadenopathy:      Cervical: No cervical adenopathy.      Upper Body:      Right upper body: No supraclavicular or axillary adenopathy.      Left upper body: No supraclavicular or axillary adenopathy.   Skin:     General: Skin is warm and dry.      Coloration: Skin is not jaundiced or pale.   Neurological:      General: No focal deficit present.      Mental Status: She is alert and oriented to person, place, and time.      Motor: No weakness.      Gait: Gait is intact.   Psychiatric:         Mood and Affect: Mood normal.         Speech: Speech normal.         Behavior: Behavior normal. Behavior is cooperative.         LABS AND IMAGING REVIEWED IN EPIC          Assessment:   [id number] well differentiated IDC of the left breast, ER+ 95%/DE+ 95%/Qbt0nic negative dx 1/25/18  Focal ductal hyperplasia of the left breast.   Fibrocystic changes of the left breast  Remote history of benign right breast lesion status post lumpectomy in 1995  Intermittent/chronic leukocytosis--> resolved.   Normal bone density at baseline; repeat DEXA 09/2020 showed osteopenia of bilateral hips   Vitamin D Deficiency--> now on weekly replacement therapy at 50,000 units.  Aromatase inhibitor induced arthralgias and hot flashes  Osteopenia      Plan:       We will continue with anastrozole as she seems to be tolerating this better than the letrozole.  Offered to try exemestane but she is not interested at this time.  She will complete therapy on 06/01/2023.    DEXA scan done on 10/19/2022     [facility] Will also get left breast ultrasound.     Continue with outpatient therapy for chronic back pain     Continue  with acupuncture as needed.    Left breast ultrasound ordered at [facility].    Blood work including BCR/ABL on 09/21/2023 due to leukocytosis was negative.     Return to clinic in 6 months.      Labs: CBC, CMP, Vitamin D Level, CA 27.29 and CEA     All questions were answered to the best of my ability and the patient understands the plan moving forward.          Kaden Chapa II, MD

## 2023-11-15 DIAGNOSIS — N64.4 BREAST PAIN: ICD-10-CM

## 2023-11-15 DIAGNOSIS — C77.3 MALIGNANT NEOPLASM METASTATIC TO LYMPH NODE OF AXILLA: ICD-10-CM

## 2023-11-15 DIAGNOSIS — C50.419: Primary | ICD-10-CM

## 2023-11-15 DIAGNOSIS — N63.0 BREAST NODULE: ICD-10-CM

## 2024-01-25 DIAGNOSIS — E55.9 VITAMIN D DEFICIENCY: ICD-10-CM

## 2024-01-25 RX ORDER — ERGOCALCIFEROL 1.25 MG/1
50000 CAPSULE ORAL
Qty: 12 CAPSULE | Refills: 3 | Status: SHIPPED | OUTPATIENT
Start: 2024-01-25

## 2024-02-07 LAB — BCS RECOMMENDATION EXT: NORMAL

## 2024-02-09 ENCOUNTER — PATIENT OUTREACH (OUTPATIENT)
Dept: ADMINISTRATIVE | Facility: HOSPITAL | Age: 69
End: 2024-02-09
Payer: MEDICARE

## 2024-02-09 NOTE — PROGRESS NOTES
The following record(s)  below were uploaded for Health Maintenance .    MAMMOGRAM SCREENING        02/07/2024      [facility].

## 2024-02-28 ENCOUNTER — TELEPHONE (OUTPATIENT)
Dept: INTERNAL MEDICINE | Facility: CLINIC | Age: 69
End: 2024-02-28
Payer: MEDICARE

## 2024-02-28 NOTE — TELEPHONE ENCOUNTER
----- Message from Rebecca Nagel sent at 2/27/2024  4:34 PM CST -----  .Caller is requesting to schedule their Lab appointment prior to annual appointment.  Order is not listed in EPIC.  Please enter order and contact patient to schedule.    Name of Caller:pt    Preferred Date and Time of Labs:    Date of EPP Appointment:    Where would they like the lab performed?[facility]    Would the patient rather a call back or a response via [facility]? corinna    Best Call Back Number:[phone]    Additional Information:need labs in epic

## 2024-02-28 NOTE — TELEPHONE ENCOUNTER
----- Message from Rebecca Nagel sent at 2/27/2024  4:34 PM CST -----  .Caller is requesting to schedule their Lab appointment prior to annual appointment.  Order is not listed in EPIC.  Please enter order and contact patient to schedule.    Name of Caller:pt    Preferred Date and Time of Labs:    Date of EPP Appointment:    Where would they like the lab performed?[facility]    Would the patient rather a call back or a response via [facility]? corinna    Best Call Back Number:[phone]    Additional Information:need labs in epic

## 2024-02-28 NOTE — TELEPHONE ENCOUNTER
Called patient to follow up on her message, message was not closed. Patient told me if was handled yesterday and hung up the phone.

## 2024-05-15 ENCOUNTER — LAB VISIT (OUTPATIENT)
Dept: LAB | Facility: HOSPITAL | Age: 69
End: 2024-05-15
Attending: INTERNAL MEDICINE
Payer: MEDICARE

## 2024-05-15 DIAGNOSIS — M85.80 OSTEOPENIA, UNSPECIFIED LOCATION: ICD-10-CM

## 2024-05-15 DIAGNOSIS — C50.419: ICD-10-CM

## 2024-05-15 DIAGNOSIS — C77.3 MALIGNANT NEOPLASM METASTATIC TO LYMPH NODE OF AXILLA: ICD-10-CM

## 2024-05-15 LAB
25(OH)D3+25(OH)D2 SERPL-MCNC: 36 NG/ML (ref 30–80)
ALBUMIN SERPL-MCNC: 4.1 G/DL (ref 3.4–4.8)
ALBUMIN/GLOB SERPL: 1.4 RATIO (ref 1.1–2)
ALP SERPL-CCNC: 85 UNIT/L (ref 40–150)
ALT SERPL-CCNC: 21 UNIT/L (ref 0–55)
AST SERPL-CCNC: 14 UNIT/L (ref 5–34)
BASOPHILS # BLD AUTO: 0.05 X10(3)/MCL
BASOPHILS NFR BLD AUTO: 0.4 %
BILIRUB SERPL-MCNC: 0.4 MG/DL
BUN SERPL-MCNC: 15.9 MG/DL (ref 9.8–20.1)
CALCIUM SERPL-MCNC: 9.9 MG/DL (ref 8.4–10.2)
CEA SERPL-MCNC: <1.73 NG/ML (ref 0–3)
CHLORIDE SERPL-SCNC: 106 MMOL/L (ref 98–107)
CO2 SERPL-SCNC: 24 MMOL/L (ref 23–31)
CREAT SERPL-MCNC: 0.65 MG/DL (ref 0.55–1.02)
EOSINOPHIL # BLD AUTO: 0.36 X10(3)/MCL (ref 0–0.9)
EOSINOPHIL NFR BLD AUTO: 3.2 %
ERYTHROCYTE [DISTWIDTH] IN BLOOD BY AUTOMATED COUNT: 13.6 % (ref 11.5–17)
GFR SERPLBLD CREATININE-BSD FMLA CKD-EPI: >60 ML/MIN/1.73/M2
GLOBULIN SER-MCNC: 3 GM/DL (ref 2.4–3.5)
GLUCOSE SERPL-MCNC: 98 MG/DL (ref 82–115)
HCT VFR BLD AUTO: 42.9 % (ref 37–47)
HGB BLD-MCNC: 14.2 G/DL (ref 12–16)
IMM GRANULOCYTES # BLD AUTO: 0.05 X10(3)/MCL (ref 0–0.04)
IMM GRANULOCYTES NFR BLD AUTO: 0.4 %
LYMPHOCYTES # BLD AUTO: 3.5 X10(3)/MCL (ref 0.6–4.6)
LYMPHOCYTES NFR BLD AUTO: 30.6 %
MCH RBC QN AUTO: 28.6 PG (ref 27–31)
MCHC RBC AUTO-ENTMCNC: 33.1 G/DL (ref 33–36)
MCV RBC AUTO: 86.5 FL (ref 80–94)
MONOCYTES # BLD AUTO: 1.05 X10(3)/MCL (ref 0.1–1.3)
MONOCYTES NFR BLD AUTO: 9.2 %
NEUTROPHILS # BLD AUTO: 6.41 X10(3)/MCL (ref 2.1–9.2)
NEUTROPHILS NFR BLD AUTO: 56.2 %
PLATELET # BLD AUTO: 355 X10(3)/MCL (ref 130–400)
PMV BLD AUTO: 9.8 FL (ref 7.4–10.4)
POTASSIUM SERPL-SCNC: 4.7 MMOL/L (ref 3.5–5.1)
PROT SERPL-MCNC: 7.1 GM/DL (ref 5.8–7.6)
RBC # BLD AUTO: 4.96 X10(6)/MCL (ref 4.2–5.4)
SODIUM SERPL-SCNC: 138 MMOL/L (ref 136–145)
WBC # SPEC AUTO: 11.42 X10(3)/MCL (ref 4.5–11.5)

## 2024-05-15 PROCEDURE — 85025 COMPLETE CBC W/AUTO DIFF WBC: CPT

## 2024-05-15 PROCEDURE — 80053 COMPREHEN METABOLIC PANEL: CPT

## 2024-05-15 PROCEDURE — 82306 VITAMIN D 25 HYDROXY: CPT

## 2024-05-15 PROCEDURE — 36415 COLL VENOUS BLD VENIPUNCTURE: CPT

## 2024-05-15 PROCEDURE — 86300 IMMUNOASSAY TUMOR CA 15-3: CPT

## 2024-05-15 PROCEDURE — 82378 CARCINOEMBRYONIC ANTIGEN: CPT

## 2024-05-16 LAB — CANCER AG27-29 SERPL-ACNC: 16.3 U/ML

## 2024-05-20 NOTE — PROGRESS NOTES
Subjective:       Patient ID: Arianna Rizvi is a 69 y.o. female.    Chief Complaint: Other Misc (Patient reports no concerns today. 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  Oncotype DX score came back at 17 placing her in the low risk category  Focal ductal hyperplasia of the left breast.    Fibrocystic changes of the left breast  Remote history of benign right breast lesion status post lumpectomy in 1995  Chronic leukocytosis, since 2004  Normal bone density at baseline; Repeat on 9/9/2020 showed osteopenia bilateral hips  Vitamin D Deficiency-- now on weekly therapy.     Current Treatment:   Switched from Femara to anastrozole on 4/6/2020.  This was due to intolerable side effects to Femara.  Vitamin E daily  Ca/Vit D    Treatment History:  Left breast lumpectomy and sentinel lymph node biopsy performed February 21, 2018--> pathology showed 1.5 cm primary tumor, well-differentiated with Ki-67 score of 32% and one sentinel lymph node with micrometastases (>0.2mm but < 2mm)  Adjvant radiation therapy 4/30/18- 6/11/18   Femara 2.5 mg p.o. daily started June 2018.    HPI:  This is a very pleasant 88-year-old female kindly referred by Dr. Hutchison for further evaluation workup following lumpectomy for well-differentiated infiltrating ductal carcinoma the left breast. The patient's history is remarkable for routine screening mammography performed December 7, 2017 which revealed a suspicious 1 cm mass with indistinct margins in the upper outer quadrant of the left breast.  She then required additional views of the left breast with ultrasound performed December 14, 2017 which revealed irregular shaped hypoechoic mass at the 3 o'clock position of the left breast 3 cm from the nipple measuring 1.7 x 1.7 x 2 cm. The patient was then set up for ultrasound guided vacuum-assisted needle core biopsy done January 25, 2018. Pathology came back  showing well-differentiated infiltrating ductal carcinoma, ER positive at 95%, AK +95%, and HER-2/rosalino negative. Ki-67 score of 32%.  The patient was referred to Dr. Hutchison and underwent preoperative breast MRI performed Fabry 14 2018. This revealed a 2 cm lobulated spiculated enhancing mass at the 3 o'clock position of the left breast. No significant or concerning internal mammary or axillary adenopathy. No other suspicious enhancing lesions in either breast.  The patient was then taken to the operating room on February 21, 2018 and underwent lumpectomy with sentinel lymph node biopsy of the left breast. Pathology revealed a 1.5 cm primary tumor well-differentiated , with clear margins. Micrometastases and one sentinel lymph node as described above. Pathologic T1c N1(mi).  The patient recovered very well from surgery and was then referred to medical oncology.  Oncotype DX assay sent off --> came back with score of 17 placing her in the low risk category for recurrence.  Baseline DEXA scan performed April 3, 2018 shows normal bone density with T score of 2.5 in the AP spine and -1.0 in the right hip and -0.1 in the left hip.  In addition, workup for leukocytosis on April 16, 2018 showed no evidence of any dysplastic cells on peripheral smear.  HIV and hepatitis panels negative/nonreactive.  LDH normal at 145.  WBC had come back down to normal as well at 9.8.  S/p completion of adjuvant radiation therapy 4/30/18- 6/11/18.  She underwent mammogram of the left breast done June 27, 2018 which showed BI-RADS 2 with recommendation for routine screening mammogram December 2018 of her bilateral breasts.  The patient agreed to start on aromatase inhibitor therapy after patient education was started on Femara towards the end of June 2018.  Patient was treated with Femara until 4/6/2020, at that time switch to anastrozole due to intolerance to Femara.  Completed adjuvant endocrine therapy in June of 2023.  BCR/ABL p190 and  p210 drawn on 09/21/2023 both negative.  CT scan of the abdomen and pelvis with contrast on 10/30/2023 showed diverticulosis with no acute inflammation and no acute abnormalities in the abdomen or pelvis.     **Most recent DEXA scan on 10/09/2022 showed osteopenia.  **Patient has undergone routine mammograms, most recently on 02/07/2024 at [facility] showing no evidence of disease.    Interval History:   Patient is here today for a follow-up for breast cancer.  Overall she was doing well.  She recently got a new chiropractor.  She has been happy with the results that she gets with him.  From a breast cancer standpoint, she has no issues to discuss.        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 Scot Chavis    dental implants      HYSTERECTOMY      LUMPECTOMY,BREAST, WITH RADIOACTIVE SEED LOCALIZATION AND SENTINEL LYMPH NODE BIOPSY Left 02/21/2018    TONSILLECTOMY       Social History     Socioeconomic History    Marital status: Single   Tobacco Use    Smoking status: Never    Smokeless tobacco: Never   Substance and Sexual Activity    Alcohol use: Not Currently    Drug use: Never      Family History   Family history unknown: Yes      Review of patient's allergies indicates:   Allergen Reactions    Atropine      Other reaction(s): heart beats fast    Epinephrine      Other reaction(s): heart beats fast    Cephalexin Rash     Other reaction(s): water blisters all over  Blisters      Doxycycline Rash    Latex Rash     blisters    Penicillins Rash     Other reaction(s): blisters all over  blisters      Sulfamethoxazole-trimethoprim Photosensitivity      Review of Systems   Constitutional:  Negative for chills, diaphoresis, fatigue, fever and  unexpected weight change.   HENT:  Negative for nasal congestion, mouth sores, sinus pressure/congestion and sore throat.    Eyes:  Negative for pain and visual disturbance.   Respiratory:  Negative for cough, chest tightness and shortness of breath.    Cardiovascular:  Negative for chest pain, palpitations and leg swelling.   Gastrointestinal:  Negative for abdominal distention, abdominal pain, blood in stool, constipation and diarrhea.   Genitourinary:  Negative for dysuria, frequency and hematuria.   Musculoskeletal:  Negative for arthralgias and back pain.   Integumentary:  Negative for rash.   Neurological:  Negative for dizziness, weakness, numbness and headaches.   Hematological:  Negative for adenopathy.   Psychiatric/Behavioral:  Negative for confusion.          Objective:      Physical Exam  Vitals reviewed. Exam conducted with a chaperone present.   Constitutional:       General: She is awake.      Appearance: Normal appearance. She is well-developed.   HENT:      Head: Normocephalic and atraumatic.   Eyes:      General: Lids are normal. Vision grossly intact.      Extraocular Movements: Extraocular movements intact.      Conjunctiva/sclera: Conjunctivae normal.   Cardiovascular:      Rate and Rhythm: Normal rate and regular rhythm.      Pulses: Normal pulses.      Heart sounds: Normal heart sounds.   Pulmonary:      Effort: Pulmonary effort is normal.      Breath sounds: Normal breath sounds.   Chest:      Chest wall: Tenderness present. No mass or swelling.   Breasts:     Right: Tenderness present.      Left: Tenderness present.   Abdominal:      General: Bowel sounds are normal. There is no distension.      Palpations: Abdomen is soft.      Tenderness: There is no abdominal tenderness.   Musculoskeletal:      Cervical back: Full passive range of motion without pain.      Right lower leg: No edema.      Left lower leg: No edema.   Lymphadenopathy:      Cervical: No cervical adenopathy.      Upper Body:       Right upper body: No supraclavicular, axillary or pectoral adenopathy.      Left upper body: No supraclavicular, axillary or pectoral adenopathy.   Skin:     General: Skin is warm and dry.   Neurological:      General: No focal deficit present.      Mental Status: She is alert and oriented to person, place, and time.   Psychiatric:         Attention and Perception: Attention and perception normal.         Behavior: Behavior is cooperative.         LABS AND IMAGING REVIEWED IN EPIC          Assessment:   [id number] well differentiated IDC of the left breast, ER+ 95%/VA+ 95%/Xsn8rql negative dx 1/25/18  Focal ductal hyperplasia of the left breast.   Fibrocystic changes of the left breast  Remote history of benign right breast lesion status post lumpectomy in 1995  Intermittent/chronic leukocytosis--> resolved.   Normal bone density at baseline; repeat DEXA 09/2020 showed osteopenia of bilateral hips   Vitamin D Deficiency--> now on weekly replacement therapy at 50,000 units.  Aromatase inhibitor induced arthralgias and hot flashes  Osteopenia      Plan:       We will continue with anastrozole as she seems to be tolerating this better than the letrozole.  Offered to try exemestane but she is not interested at this time.  She completed therapy on 06/01/2023.    Continue with outpatient therapy for chronic back pain     Continue with acupuncture as needed.    Diagnostic bilateral mammogram and left breast US done on 02/07/2024 at [facility] showed no evidence of disease.    Blood work including BCR/ABL on 09/21/2023 due to leukocytosis was negative.     Return to clinic in 6 months.      Labs: CBC, CMP, Vitamin D Level, CA 27.29 and CEA     All questions were answered to the best of my ability and the patient understands the plan moving forward.          Kaden Chapa II, MD

## 2024-05-21 ENCOUNTER — OFFICE VISIT (OUTPATIENT)
Dept: HEMATOLOGY/ONCOLOGY | Facility: CLINIC | Age: 69
End: 2024-05-21
Payer: MEDICARE

## 2024-05-21 VITALS
WEIGHT: 233.56 LBS | RESPIRATION RATE: 14 BRPM | HEIGHT: 68 IN | DIASTOLIC BLOOD PRESSURE: 80 MMHG | HEART RATE: 95 BPM | BODY MASS INDEX: 35.4 KG/M2 | SYSTOLIC BLOOD PRESSURE: 144 MMHG | OXYGEN SATURATION: 97 %

## 2024-05-21 DIAGNOSIS — C77.3 MALIGNANT NEOPLASM METASTATIC TO LYMPH NODE OF AXILLA: ICD-10-CM

## 2024-05-21 DIAGNOSIS — M85.80 OSTEOPENIA, UNSPECIFIED LOCATION: ICD-10-CM

## 2024-05-21 DIAGNOSIS — C50.419: Primary | ICD-10-CM

## 2024-05-21 PROCEDURE — 99999 PR PBB SHADOW E&M-EST. PATIENT-LVL III: CPT | Mod: PBBFAC,,, | Performed by: INTERNAL MEDICINE

## 2024-05-21 PROCEDURE — 99213 OFFICE O/P EST LOW 20 MIN: CPT | Mod: PBBFAC | Performed by: INTERNAL MEDICINE

## 2024-05-21 PROCEDURE — 99214 OFFICE O/P EST MOD 30 MIN: CPT | Mod: S$PBB,,, | Performed by: INTERNAL MEDICINE

## 2024-05-29 DIAGNOSIS — Z13.21 SCREENING FOR ENDOCRINE, NUTRITIONAL, METABOLIC AND IMMUNITY DISORDER: Primary | ICD-10-CM

## 2024-05-29 DIAGNOSIS — Z13.89 SCREENING FOR CARDIOVASCULAR, RESPIRATORY, AND GENITOURINARY DISEASES: ICD-10-CM

## 2024-05-29 DIAGNOSIS — Z13.83 SCREENING FOR CARDIOVASCULAR, RESPIRATORY, AND GENITOURINARY DISEASES: ICD-10-CM

## 2024-05-29 DIAGNOSIS — Z13.6 SCREENING FOR CARDIOVASCULAR, RESPIRATORY, AND GENITOURINARY DISEASES: ICD-10-CM

## 2024-05-29 DIAGNOSIS — Z13.228 SCREENING FOR ENDOCRINE, NUTRITIONAL, METABOLIC AND IMMUNITY DISORDER: Primary | ICD-10-CM

## 2024-05-29 DIAGNOSIS — E55.9 VITAMIN D DEFICIENCY: ICD-10-CM

## 2024-05-29 DIAGNOSIS — Z13.29 SCREENING FOR ENDOCRINE, NUTRITIONAL, METABOLIC AND IMMUNITY DISORDER: Primary | ICD-10-CM

## 2024-05-29 DIAGNOSIS — Z13.0 SCREENING FOR ENDOCRINE, NUTRITIONAL, METABOLIC AND IMMUNITY DISORDER: Primary | ICD-10-CM

## 2024-05-29 DIAGNOSIS — Z79.899 ENCOUNTER FOR LONG-TERM (CURRENT) USE OF MEDICATIONS: ICD-10-CM

## 2024-07-31 ENCOUNTER — LAB VISIT (OUTPATIENT)
Dept: LAB | Facility: HOSPITAL | Age: 69
End: 2024-07-31
Attending: INTERNAL MEDICINE
Payer: MEDICARE

## 2024-07-31 DIAGNOSIS — Z13.21 SCREENING FOR ENDOCRINE, NUTRITIONAL, METABOLIC AND IMMUNITY DISORDER: ICD-10-CM

## 2024-07-31 DIAGNOSIS — Z79.899 ENCOUNTER FOR LONG-TERM (CURRENT) USE OF MEDICATIONS: ICD-10-CM

## 2024-07-31 DIAGNOSIS — Z13.228 SCREENING FOR ENDOCRINE, NUTRITIONAL, METABOLIC AND IMMUNITY DISORDER: ICD-10-CM

## 2024-07-31 DIAGNOSIS — Z13.6 SCREENING FOR CARDIOVASCULAR, RESPIRATORY, AND GENITOURINARY DISEASES: ICD-10-CM

## 2024-07-31 DIAGNOSIS — E55.9 VITAMIN D DEFICIENCY: ICD-10-CM

## 2024-07-31 DIAGNOSIS — Z13.29 SCREENING FOR ENDOCRINE, NUTRITIONAL, METABOLIC AND IMMUNITY DISORDER: ICD-10-CM

## 2024-07-31 DIAGNOSIS — Z13.0 SCREENING FOR ENDOCRINE, NUTRITIONAL, METABOLIC AND IMMUNITY DISORDER: ICD-10-CM

## 2024-07-31 DIAGNOSIS — Z13.83 SCREENING FOR CARDIOVASCULAR, RESPIRATORY, AND GENITOURINARY DISEASES: ICD-10-CM

## 2024-07-31 DIAGNOSIS — Z13.89 SCREENING FOR CARDIOVASCULAR, RESPIRATORY, AND GENITOURINARY DISEASES: ICD-10-CM

## 2024-07-31 LAB
ALBUMIN SERPL-MCNC: 4.1 G/DL (ref 3.4–4.8)
ALBUMIN/GLOB SERPL: 1.4 RATIO (ref 1.1–2)
ALP SERPL-CCNC: 82 UNIT/L (ref 40–150)
ALT SERPL-CCNC: 16 UNIT/L (ref 0–55)
ANION GAP SERPL CALC-SCNC: 8 MEQ/L
AST SERPL-CCNC: 12 UNIT/L (ref 5–34)
BASOPHILS # BLD AUTO: 0.13 X10(3)/MCL
BASOPHILS NFR BLD AUTO: 1.3 %
BILIRUB SERPL-MCNC: 0.4 MG/DL
BUN SERPL-MCNC: 14.3 MG/DL (ref 9.8–20.1)
CALCIUM SERPL-MCNC: 10 MG/DL (ref 8.4–10.2)
CHLORIDE SERPL-SCNC: 106 MMOL/L (ref 98–107)
CHOLEST SERPL-MCNC: 243 MG/DL
CHOLEST/HDLC SERPL: 4 {RATIO} (ref 0–5)
CO2 SERPL-SCNC: 25 MMOL/L (ref 23–31)
CREAT SERPL-MCNC: 0.75 MG/DL (ref 0.55–1.02)
CREAT/UREA NIT SERPL: 19
EOSINOPHIL # BLD AUTO: 0.49 X10(3)/MCL (ref 0–0.9)
EOSINOPHIL NFR BLD AUTO: 4.9 %
ERYTHROCYTE [DISTWIDTH] IN BLOOD BY AUTOMATED COUNT: 13.8 % (ref 11.5–17)
GFR SERPLBLD CREATININE-BSD FMLA CKD-EPI: >60 ML/MIN/1.73/M2
GLOBULIN SER-MCNC: 2.9 GM/DL (ref 2.4–3.5)
GLUCOSE SERPL-MCNC: 136 MG/DL (ref 82–115)
HCT VFR BLD AUTO: 42.7 % (ref 37–47)
HDLC SERPL-MCNC: 59 MG/DL (ref 35–60)
HGB BLD-MCNC: 13.9 G/DL (ref 12–16)
IMM GRANULOCYTES # BLD AUTO: 0.06 X10(3)/MCL (ref 0–0.04)
IMM GRANULOCYTES NFR BLD AUTO: 0.6 %
LDLC SERPL CALC-MCNC: 157 MG/DL (ref 50–140)
LYMPHOCYTES # BLD AUTO: 2.91 X10(3)/MCL (ref 0.6–4.6)
LYMPHOCYTES NFR BLD AUTO: 29.1 %
MCH RBC QN AUTO: 28.4 PG (ref 27–31)
MCHC RBC AUTO-ENTMCNC: 32.6 G/DL (ref 33–36)
MCV RBC AUTO: 87.3 FL (ref 80–94)
MONOCYTES # BLD AUTO: 0.74 X10(3)/MCL (ref 0.1–1.3)
MONOCYTES NFR BLD AUTO: 7.4 %
NEUTROPHILS # BLD AUTO: 5.66 X10(3)/MCL (ref 2.1–9.2)
NEUTROPHILS NFR BLD AUTO: 56.7 %
NRBC BLD AUTO-RTO: 0 %
PLATELET # BLD AUTO: 339 X10(3)/MCL (ref 130–400)
PLATELETS.RETICULATED NFR BLD AUTO: 2.5 % (ref 0.9–11.2)
PMV BLD AUTO: 10.3 FL (ref 7.4–10.4)
POTASSIUM SERPL-SCNC: 4.8 MMOL/L (ref 3.5–5.1)
PROT SERPL-MCNC: 7 GM/DL (ref 5.8–7.6)
RBC # BLD AUTO: 4.89 X10(6)/MCL (ref 4.2–5.4)
SODIUM SERPL-SCNC: 139 MMOL/L (ref 136–145)
TRIGL SERPL-MCNC: 134 MG/DL (ref 37–140)
TSH SERPL-ACNC: 1.19 UIU/ML (ref 0.35–4.94)
VLDLC SERPL CALC-MCNC: 27 MG/DL
WBC # BLD AUTO: 9.99 X10(3)/MCL (ref 4.5–11.5)

## 2024-07-31 PROCEDURE — 84443 ASSAY THYROID STIM HORMONE: CPT

## 2024-07-31 PROCEDURE — 80053 COMPREHEN METABOLIC PANEL: CPT

## 2024-07-31 PROCEDURE — 85025 COMPLETE CBC W/AUTO DIFF WBC: CPT

## 2024-07-31 PROCEDURE — 80061 LIPID PANEL: CPT

## 2024-07-31 PROCEDURE — 36415 COLL VENOUS BLD VENIPUNCTURE: CPT

## 2024-08-08 ENCOUNTER — OFFICE VISIT (OUTPATIENT)
Dept: INTERNAL MEDICINE | Facility: CLINIC | Age: 69
End: 2024-08-08
Payer: MEDICARE

## 2024-08-08 VITALS
HEART RATE: 85 BPM | OXYGEN SATURATION: 97 % | SYSTOLIC BLOOD PRESSURE: 136 MMHG | DIASTOLIC BLOOD PRESSURE: 80 MMHG | HEIGHT: 68 IN | WEIGHT: 230 LBS | BODY MASS INDEX: 34.86 KG/M2

## 2024-08-08 DIAGNOSIS — Z00.00 ENCOUNTER FOR PREVENTIVE HEALTH EXAMINATION: ICD-10-CM

## 2024-08-08 DIAGNOSIS — R73.09 IMPAIRED GLUCOSE REGULATION: ICD-10-CM

## 2024-08-08 DIAGNOSIS — D47.1 CHRONIC MYELOPROLIFERATIVE DISEASE: ICD-10-CM

## 2024-08-08 DIAGNOSIS — Z23 NEED FOR SHINGLES VACCINE: ICD-10-CM

## 2024-08-08 DIAGNOSIS — I70.0 AORTIC ATHEROSCLEROSIS: ICD-10-CM

## 2024-08-08 DIAGNOSIS — E66.09 CLASS 1 OBESITY DUE TO EXCESS CALORIES WITH SERIOUS COMORBIDITY AND BODY MASS INDEX (BMI) OF 34.0 TO 34.9 IN ADULT: ICD-10-CM

## 2024-08-08 DIAGNOSIS — Z72.3 LACK OF PHYSICAL ACTIVITY: ICD-10-CM

## 2024-08-08 DIAGNOSIS — Z00.00 MEDICARE ANNUAL WELLNESS VISIT, SUBSEQUENT: Primary | ICD-10-CM

## 2024-08-08 PROBLEM — E66.01 SEVERE OBESITY (BMI 35.0-39.9) WITH COMORBIDITY: Status: ACTIVE | Noted: 2024-08-08

## 2024-08-08 PROBLEM — E66.811 CLASS 1 OBESITY DUE TO EXCESS CALORIES WITH SERIOUS COMORBIDITY AND BODY MASS INDEX (BMI) OF 34.0 TO 34.9 IN ADULT: Status: ACTIVE | Noted: 2024-08-08

## 2024-08-08 PROBLEM — C77.9 MALIGNANT NEOPLASM METASTATIC TO LYMPH NODES: Status: RESOLVED | Noted: 2022-09-15 | Resolved: 2024-08-08

## 2024-11-13 ENCOUNTER — LAB VISIT (OUTPATIENT)
Dept: LAB | Facility: HOSPITAL | Age: 69
End: 2024-11-13
Attending: INTERNAL MEDICINE
Payer: MEDICARE

## 2024-11-13 DIAGNOSIS — M85.80 OSTEOPENIA, UNSPECIFIED LOCATION: ICD-10-CM

## 2024-11-13 DIAGNOSIS — C50.419: ICD-10-CM

## 2024-11-13 DIAGNOSIS — C77.3 MALIGNANT NEOPLASM METASTATIC TO LYMPH NODE OF AXILLA: ICD-10-CM

## 2024-11-13 LAB
25(OH)D3+25(OH)D2 SERPL-MCNC: 82 NG/ML (ref 30–80)
ALBUMIN SERPL-MCNC: 4.2 G/DL (ref 3.4–4.8)
ALBUMIN/GLOB SERPL: 1.3 RATIO (ref 1.1–2)
ALP SERPL-CCNC: 75 UNIT/L (ref 40–150)
ALT SERPL-CCNC: 14 UNIT/L (ref 0–55)
ANION GAP SERPL CALC-SCNC: 9 MEQ/L
AST SERPL-CCNC: 13 UNIT/L (ref 5–34)
BASOPHILS # BLD AUTO: 0.06 X10(3)/MCL
BASOPHILS NFR BLD AUTO: 0.6 %
BILIRUB SERPL-MCNC: 0.4 MG/DL
BUN SERPL-MCNC: 12 MG/DL (ref 9.8–20.1)
CALCIUM SERPL-MCNC: 10.6 MG/DL (ref 8.4–10.2)
CEA SERPL-MCNC: 2.87 NG/ML (ref 0–3)
CHLORIDE SERPL-SCNC: 105 MMOL/L (ref 98–107)
CO2 SERPL-SCNC: 26 MMOL/L (ref 23–31)
CREAT SERPL-MCNC: 0.7 MG/DL (ref 0.55–1.02)
CREAT/UREA NIT SERPL: 17
EOSINOPHIL # BLD AUTO: 0.19 X10(3)/MCL (ref 0–0.9)
EOSINOPHIL NFR BLD AUTO: 1.8 %
ERYTHROCYTE [DISTWIDTH] IN BLOOD BY AUTOMATED COUNT: 13.2 % (ref 11.5–17)
GFR SERPLBLD CREATININE-BSD FMLA CKD-EPI: >60 ML/MIN/1.73/M2
GLOBULIN SER-MCNC: 3.2 GM/DL (ref 2.4–3.5)
GLUCOSE SERPL-MCNC: 99 MG/DL (ref 82–115)
HCT VFR BLD AUTO: 41.8 % (ref 37–47)
HGB BLD-MCNC: 13.9 G/DL (ref 12–16)
IMM GRANULOCYTES # BLD AUTO: 0.03 X10(3)/MCL (ref 0–0.04)
IMM GRANULOCYTES NFR BLD AUTO: 0.3 %
LYMPHOCYTES # BLD AUTO: 2.6 X10(3)/MCL (ref 0.6–4.6)
LYMPHOCYTES NFR BLD AUTO: 25.2 %
MCH RBC QN AUTO: 28.5 PG (ref 27–31)
MCHC RBC AUTO-ENTMCNC: 33.3 G/DL (ref 33–36)
MCV RBC AUTO: 85.8 FL (ref 80–94)
MONOCYTES # BLD AUTO: 0.77 X10(3)/MCL (ref 0.1–1.3)
MONOCYTES NFR BLD AUTO: 7.5 %
NEUTROPHILS # BLD AUTO: 6.68 X10(3)/MCL (ref 2.1–9.2)
NEUTROPHILS NFR BLD AUTO: 64.6 %
PLATELET # BLD AUTO: 368 X10(3)/MCL (ref 130–400)
PMV BLD AUTO: 10.1 FL (ref 7.4–10.4)
POTASSIUM SERPL-SCNC: 5.1 MMOL/L (ref 3.5–5.1)
PROT SERPL-MCNC: 7.4 GM/DL (ref 5.8–7.6)
RBC # BLD AUTO: 4.87 X10(6)/MCL (ref 4.2–5.4)
SODIUM SERPL-SCNC: 140 MMOL/L (ref 136–145)
WBC # BLD AUTO: 10.33 X10(3)/MCL (ref 4.5–11.5)

## 2024-11-13 PROCEDURE — 86300 IMMUNOASSAY TUMOR CA 15-3: CPT

## 2024-11-13 PROCEDURE — 82378 CARCINOEMBRYONIC ANTIGEN: CPT

## 2024-11-13 PROCEDURE — 85025 COMPLETE CBC W/AUTO DIFF WBC: CPT

## 2024-11-13 PROCEDURE — 80053 COMPREHEN METABOLIC PANEL: CPT

## 2024-11-13 PROCEDURE — 36415 COLL VENOUS BLD VENIPUNCTURE: CPT

## 2024-11-13 PROCEDURE — 82306 VITAMIN D 25 HYDROXY: CPT

## 2024-11-14 LAB — CANCER AG27-29 SERPL-ACNC: 14.7 U/ML

## 2024-11-19 ENCOUNTER — OFFICE VISIT (OUTPATIENT)
Dept: HEMATOLOGY/ONCOLOGY | Facility: CLINIC | Age: 69
End: 2024-11-19
Payer: MEDICARE

## 2024-11-19 VITALS
RESPIRATION RATE: 18 BRPM | OXYGEN SATURATION: 97 % | DIASTOLIC BLOOD PRESSURE: 78 MMHG | HEART RATE: 88 BPM | SYSTOLIC BLOOD PRESSURE: 154 MMHG

## 2024-11-19 DIAGNOSIS — M85.80 OSTEOPENIA, UNSPECIFIED LOCATION: ICD-10-CM

## 2024-11-19 DIAGNOSIS — Z12.31 SCREENING MAMMOGRAM FOR BREAST CANCER: Primary | ICD-10-CM

## 2024-11-19 DIAGNOSIS — C50.419: ICD-10-CM

## 2024-11-19 PROCEDURE — 99213 OFFICE O/P EST LOW 20 MIN: CPT | Mod: PBBFAC | Performed by: INTERNAL MEDICINE

## 2024-11-19 PROCEDURE — 99999 PR PBB SHADOW E&M-EST. PATIENT-LVL III: CPT | Mod: PBBFAC,,, | Performed by: INTERNAL MEDICINE

## 2024-11-19 NOTE — PROGRESS NOTES
Subjective:       Patient ID: Arianna Rizvi is a 69 y.o. female.    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  Oncotype DX score came back at 17 placing her in the low risk category  Focal ductal hyperplasia of the left breast.    Fibrocystic changes of the left breast  Remote history of benign right breast lesion status post lumpectomy in 1995  Chronic leukocytosis, since 2004  Normal bone density at baseline; Repeat on 9/9/2020 showed osteopenia bilateral hips  Vitamin D Deficiency-- now on weekly therapy.     Current Treatment:   Switched from Femara to anastrozole on 4/6/2020.  This was due to intolerable side effects to Femara. She completed therapy on 06/01/2023.   Vitamin E daily  Ca/Vit D    Treatment History:  Left breast lumpectomy and sentinel lymph node biopsy performed February 21, 2018--> pathology showed 1.5 cm primary tumor, well-differentiated with Ki-67 score of 32% and one sentinel lymph node with micrometastases (>0.2mm but < 2mm)  Adjvant radiation therapy 4/30/18- 6/11/18   Femara 2.5 mg p.o. daily started June 2018.    HPI:  This is a very pleasant 69-year-old female kindly referred by Dr. Hutchison for further evaluation workup following lumpectomy for well-differentiated infiltrating ductal carcinoma the left breast. The patient's history is remarkable for routine screening mammography performed December 7, 2017 which revealed a suspicious 1 cm mass with indistinct margins in the upper outer quadrant of the left breast.  She then required additional views of the left breast with ultrasound performed December 14, 2017 which revealed irregular shaped hypoechoic mass at the 3 o'clock position of the left breast 3 cm from the nipple measuring 1.7 x 1.7 x 2 cm. The patient was then set up for ultrasound guided vacuum-assisted needle core biopsy done January 25, 2018.  Pathology came back showing well-differentiated infiltrating ductal carcinoma, ER positive at 95%, SC +95%, and HER-2/rosalino negative. Ki-67 score of 32%.  The patient was referred to Dr. Hutchison and underwent preoperative breast MRI performed Fabry 14 2018. This revealed a 2 cm lobulated spiculated enhancing mass at the 3 o'clock position of the left breast. No significant or concerning internal mammary or axillary adenopathy. No other suspicious enhancing lesions in either breast.  The patient was then taken to the operating room on February 21, 2018 and underwent lumpectomy with sentinel lymph node biopsy of the left breast. Pathology revealed a 1.5 cm primary tumor well-differentiated , with clear margins. Micrometastases and one sentinel lymph node as described above. Pathologic T1c N1(mi).  The patient recovered very well from surgery and was then referred to medical oncology.  Oncotype DX assay sent off --> came back with score of 17 placing her in the low risk category for recurrence.  Baseline DEXA scan performed April 3, 2018 shows normal bone density with T score of 2.5 in the AP spine and -1.0 in the right hip and -0.1 in the left hip.  In addition, workup for leukocytosis on April 16, 2018 showed no evidence of any dysplastic cells on peripheral smear.  HIV and hepatitis panels negative/nonreactive.  LDH normal at 145.  WBC had come back down to normal as well at 9.8.  S/p completion of adjuvant radiation therapy 4/30/18- 6/11/18.  She underwent mammogram of the left breast done June 27, 2018 which showed BI-RADS 2 with recommendation for routine screening mammogram December 2018 of her bilateral breasts.  The patient agreed to start on aromatase inhibitor therapy after patient education was started on Femara towards the end of June 2018.  Patient was treated with Femara until 4/6/2020, at that time switch to anastrozole due to intolerance to Femara.  Completed adjuvant endocrine therapy in June of 2023.   BCR/ABL p190 and p210 drawn on 09/21/2023 both negative.  CT scan of the abdomen and pelvis with contrast on 10/30/2023 showed diverticulosis with no acute inflammation and no acute abnormalities in the abdomen or pelvis.     **Most recent DEXA scan on 10/09/2022 showed osteopenia.  **Patient has undergone routine mammograms, most recently on 02/07/2024 at [facility] showing no evidence of disease.    Interval History:   Patient is here today for a follow-up for breast cancer.  Overall she was doing well. From a breast cancer standpoint, she has no issues to discuss.  She does state that she continues to have joint pain, but she attributes this to her previous aromatase inhibitor, carpal tunnel, and some possible neck issues.  Otherwise, she continues to try to exercise by doing water walking.        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 Scot Chavis    dental implants      HYSTERECTOMY      LUMPECTOMY,BREAST, WITH RADIOACTIVE SEED LOCALIZATION AND SENTINEL LYMPH NODE BIOPSY Left 02/21/2018    TONSILLECTOMY       Social History     Socioeconomic History    Marital status: Single   Tobacco Use    Smoking status: Never    Smokeless tobacco: Never   Substance and Sexual Activity    Alcohol use: Not Currently    Drug use: Never     Social Drivers of Health     Financial Resource Strain: Low Risk  (8/8/2024)    Overall Financial Resource Strain (CARDIA)     Difficulty of Paying Living Expenses: Not hard at all   Food Insecurity: No Food Insecurity (8/8/2024)    Hunger Vital Sign     Worried About Running Out of Food in the Last Year: Never true     Ran Out of Food in the Last Year: Never true   Transportation Needs: No Transportation Needs  (8/8/2024)    TRANSPORTATION NEEDS     Transportation : No   Physical Activity: Insufficiently Active (8/8/2024)    Exercise Vital Sign     Days of Exercise per Week: 3 days     Minutes of Exercise per Session: 30 min   Stress: No Stress Concern Present (8/8/2024)    [employer] [place] - Occupational Stress Questionnaire     Feeling of Stress : Not at all   Housing Stability: Low Risk  (8/8/2024)    Housing Stability Vital Sign     Unable to Pay for Housing in the Last Year: No     Homeless in the Last Year: No      Family History   Family history unknown: Yes      Review of patient's allergies indicates:   Allergen Reactions    Atropine      Other reaction(s): heart beats fast    Epinephrine      Other reaction(s): heart beats fast    Cephalexin Rash     Other reaction(s): water blisters all over  Blisters      Doxycycline Rash    Latex Rash     blisters    Penicillins Rash     Other reaction(s): blisters all over  blisters      Sulfamethoxazole-trimethoprim Photosensitivity      Review of Systems   Constitutional:  Negative for chills, diaphoresis, fatigue, fever and unexpected weight change.   HENT:  Negative for nasal congestion, mouth sores, sinus pressure/congestion and sore throat.    Eyes:  Negative for pain and visual disturbance.   Respiratory:  Negative for cough, chest tightness and shortness of breath.    Cardiovascular:  Negative for chest pain, palpitations and leg swelling.   Gastrointestinal:  Negative for abdominal distention, abdominal pain, blood in stool, constipation and diarrhea.   Genitourinary:  Negative for dysuria, frequency and hematuria.   Musculoskeletal:  Negative for arthralgias and back pain.   Integumentary:  Negative for rash.   Neurological:  Negative for dizziness, weakness, numbness and headaches.   Hematological:  Negative for adenopathy.   Psychiatric/Behavioral:  Negative for confusion.          Objective:      Physical Exam  Vitals reviewed. Exam  conducted with a chaperone present.   Constitutional:       General: She is awake.      Appearance: Normal appearance. She is well-developed.   HENT:      Head: Normocephalic and atraumatic.   Eyes:      General: Lids are normal. Vision grossly intact.      Extraocular Movements: Extraocular movements intact.      Conjunctiva/sclera: Conjunctivae normal.   Cardiovascular:      Rate and Rhythm: Normal rate and regular rhythm.      Pulses: Normal pulses.      Heart sounds: Normal heart sounds.   Pulmonary:      Effort: Pulmonary effort is normal.      Breath sounds: Normal breath sounds.   Chest:      Chest wall: Tenderness present. No mass or swelling.   Breasts:     Right: Tenderness present.      Left: Tenderness present.      Comments: No abnormal masses and no nipple inversion on either breast.  Abdominal:      General: Bowel sounds are normal. There is no distension.      Palpations: Abdomen is soft.      Tenderness: There is no abdominal tenderness.   Musculoskeletal:      Cervical back: Full passive range of motion without pain.      Right lower leg: No edema.      Left lower leg: No edema.   Lymphadenopathy:      Cervical: No cervical adenopathy.      Upper Body:      Right upper body: No supraclavicular, axillary or pectoral adenopathy.      Left upper body: No supraclavicular, axillary or pectoral adenopathy.   Skin:     General: Skin is warm and dry.   Neurological:      General: No focal deficit present.      Mental Status: She is alert and oriented to person, place, and time.   Psychiatric:         Attention and Perception: Attention and perception normal.         Behavior: Behavior is cooperative.         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   Fibrocystic changes of the left breast  Remote history of benign right breast lesion status post  lumpectomy in 1995  Intermittent/chronic leukocytosis--> resolved.   Normal bone density at baseline; repeat DEXA 09/2020 showed osteopenia of bilateral hips   Vitamin D Deficiency--> now on weekly replacement therapy at 50,000 units.  Aromatase inhibitor induced arthralgias and hot flashes  Osteopenia      Plan:       We will continue with anastrozole as she seems to be tolerating this better than the letrozole.  Offered to try exemestane but she is not interested at this time.  She completed therapy on 06/01/2023.    Continue with outpatient therapy for chronic back pain     Continue with acupuncture as needed.    Blood work including BCR/ABL on 09/21/2023 due to leukocytosis was negative.     Diagnostic bilateral mammogram and left breast US done on 02/07/2024 at [facility] showed no evidence of disease.     Routine screening mammogram due in 02/2025 - ordered today    Return to clinic in 6 months.      Labs: CBC, CMP, Vitamin D Level, CA 27.29 and CEA     All questions were answered to the best of my ability and the patient understands the plan moving forward.          Kaden Chapa II, MD

## 2025-02-05 ENCOUNTER — TELEPHONE (OUTPATIENT)
Dept: INTERNAL MEDICINE | Facility: CLINIC | Age: 70
End: 2025-02-05
Payer: MEDICARE

## 2025-02-05 DIAGNOSIS — R73.09 IMPAIRED GLUCOSE REGULATION: Primary | ICD-10-CM

## 2025-02-19 ENCOUNTER — TELEPHONE (OUTPATIENT)
Dept: INTERNAL MEDICINE | Facility: CLINIC | Age: 70
End: 2025-02-19
Payer: MEDICARE

## 2025-02-19 DIAGNOSIS — Z13.83 SCREENING FOR CARDIOVASCULAR, RESPIRATORY, AND GENITOURINARY DISEASES: ICD-10-CM

## 2025-02-19 DIAGNOSIS — Z13.21 SCREENING FOR ENDOCRINE, NUTRITIONAL, METABOLIC AND IMMUNITY DISORDER: ICD-10-CM

## 2025-02-19 DIAGNOSIS — Z13.29 SCREENING FOR ENDOCRINE, NUTRITIONAL, METABOLIC AND IMMUNITY DISORDER: ICD-10-CM

## 2025-02-19 DIAGNOSIS — I70.0 AORTIC ATHEROSCLEROSIS: ICD-10-CM

## 2025-02-19 DIAGNOSIS — Z13.228 SCREENING FOR ENDOCRINE, NUTRITIONAL, METABOLIC AND IMMUNITY DISORDER: ICD-10-CM

## 2025-02-19 DIAGNOSIS — Z01.818 PRE-OPERATIVE CLEARANCE: Primary | ICD-10-CM

## 2025-02-19 DIAGNOSIS — Z13.0 SCREENING FOR ENDOCRINE, NUTRITIONAL, METABOLIC AND IMMUNITY DISORDER: ICD-10-CM

## 2025-02-19 DIAGNOSIS — Z13.89 SCREENING FOR CARDIOVASCULAR, RESPIRATORY, AND GENITOURINARY DISEASES: ICD-10-CM

## 2025-02-19 DIAGNOSIS — Z13.6 SCREENING FOR CARDIOVASCULAR, RESPIRATORY, AND GENITOURINARY DISEASES: ICD-10-CM

## 2025-04-08 DIAGNOSIS — E55.9 VITAMIN D DEFICIENCY: ICD-10-CM

## 2025-04-08 RX ORDER — ERGOCALCIFEROL 1.25 MG/1
50000 CAPSULE ORAL
Qty: 12 CAPSULE | Refills: 3 | Status: SHIPPED | OUTPATIENT
Start: 2025-04-08

## 2025-04-15 ENCOUNTER — LAB VISIT (OUTPATIENT)
Dept: LAB | Facility: HOSPITAL | Age: 70
End: 2025-04-15
Attending: INTERNAL MEDICINE
Payer: MEDICARE

## 2025-04-15 DIAGNOSIS — Z13.228 SCREENING FOR ENDOCRINE, NUTRITIONAL, METABOLIC AND IMMUNITY DISORDER: ICD-10-CM

## 2025-04-15 DIAGNOSIS — Z13.29 SCREENING FOR ENDOCRINE, NUTRITIONAL, METABOLIC AND IMMUNITY DISORDER: ICD-10-CM

## 2025-04-15 DIAGNOSIS — I70.0 AORTIC ATHEROSCLEROSIS: ICD-10-CM

## 2025-04-15 DIAGNOSIS — Z13.21 SCREENING FOR ENDOCRINE, NUTRITIONAL, METABOLIC AND IMMUNITY DISORDER: ICD-10-CM

## 2025-04-15 DIAGNOSIS — Z13.89 SCREENING FOR CARDIOVASCULAR, RESPIRATORY, AND GENITOURINARY DISEASES: ICD-10-CM

## 2025-04-15 DIAGNOSIS — Z13.6 SCREENING FOR CARDIOVASCULAR, RESPIRATORY, AND GENITOURINARY DISEASES: ICD-10-CM

## 2025-04-15 DIAGNOSIS — Z01.818 PRE-OPERATIVE CLEARANCE: ICD-10-CM

## 2025-04-15 DIAGNOSIS — Z13.0 SCREENING FOR ENDOCRINE, NUTRITIONAL, METABOLIC AND IMMUNITY DISORDER: ICD-10-CM

## 2025-04-15 DIAGNOSIS — Z13.83 SCREENING FOR CARDIOVASCULAR, RESPIRATORY, AND GENITOURINARY DISEASES: ICD-10-CM

## 2025-04-15 LAB
BASOPHILS # BLD AUTO: 0.09 X10(3)/MCL
BASOPHILS NFR BLD AUTO: 1.1 %
EOSINOPHIL # BLD AUTO: 0.32 X10(3)/MCL (ref 0–0.9)
EOSINOPHIL NFR BLD AUTO: 3.9 %
ERYTHROCYTE [DISTWIDTH] IN BLOOD BY AUTOMATED COUNT: 13.4 % (ref 11.5–17)
HCT VFR BLD AUTO: 42.3 % (ref 37–47)
HGB BLD-MCNC: 13.4 G/DL (ref 12–16)
IMM GRANULOCYTES # BLD AUTO: 0.03 X10(3)/MCL (ref 0–0.04)
IMM GRANULOCYTES NFR BLD AUTO: 0.4 %
LYMPHOCYTES # BLD AUTO: 2.56 X10(3)/MCL (ref 0.6–4.6)
LYMPHOCYTES NFR BLD AUTO: 30.9 %
MCH RBC QN AUTO: 28.1 PG (ref 27–31)
MCHC RBC AUTO-ENTMCNC: 31.7 G/DL (ref 33–36)
MCV RBC AUTO: 88.7 FL (ref 80–94)
MONOCYTES # BLD AUTO: 0.68 X10(3)/MCL (ref 0.1–1.3)
MONOCYTES NFR BLD AUTO: 8.2 %
NEUTROPHILS # BLD AUTO: 4.61 X10(3)/MCL (ref 2.1–9.2)
NEUTROPHILS NFR BLD AUTO: 55.5 %
NRBC BLD AUTO-RTO: 0 %
OHS QRS DURATION: 78 MS
OHS QTC CALCULATION: 462 MS
PLATELET # BLD AUTO: 315 X10(3)/MCL (ref 130–400)
PMV BLD AUTO: 10.4 FL (ref 7.4–10.4)
RBC # BLD AUTO: 4.77 X10(6)/MCL (ref 4.2–5.4)
WBC # BLD AUTO: 8.29 X10(3)/MCL (ref 4.5–11.5)

## 2025-04-15 PROCEDURE — 85025 COMPLETE CBC W/AUTO DIFF WBC: CPT

## 2025-04-15 PROCEDURE — 93010 ELECTROCARDIOGRAM REPORT: CPT | Mod: ,,, | Performed by: INTERNAL MEDICINE

## 2025-04-15 PROCEDURE — 93005 ELECTROCARDIOGRAM TRACING: CPT

## 2025-04-15 PROCEDURE — 36415 COLL VENOUS BLD VENIPUNCTURE: CPT

## 2025-04-24 ENCOUNTER — TELEPHONE (OUTPATIENT)
Dept: INTERNAL MEDICINE | Facility: CLINIC | Age: 70
End: 2025-04-24
Payer: MEDICARE

## 2025-04-24 NOTE — TELEPHONE ENCOUNTER
----- Message from Med Assistant Rodriguez sent at 2025 11:22 AM CDT -----  [email or web address] appt  Patient has an appointment on 2025 at 1:00pmNO Fasting labs Please call and remind patient of appointment

## 2025-05-01 ENCOUNTER — OFFICE VISIT (OUTPATIENT)
Dept: INTERNAL MEDICINE | Facility: CLINIC | Age: 70
End: 2025-05-01
Payer: MEDICARE

## 2025-05-01 VITALS — DIASTOLIC BLOOD PRESSURE: 76 MMHG | SYSTOLIC BLOOD PRESSURE: 128 MMHG

## 2025-05-01 DIAGNOSIS — C50.412 MALIGNANT NEOPLASM OF UPPER-OUTER QUADRANT OF LEFT BREAST IN FEMALE, ESTROGEN RECEPTOR POSITIVE: ICD-10-CM

## 2025-05-01 DIAGNOSIS — M25.551 BILATERAL HIP PAIN: ICD-10-CM

## 2025-05-01 DIAGNOSIS — Z01.818 PRE-OP EVALUATION: Primary | ICD-10-CM

## 2025-05-01 DIAGNOSIS — Z17.0 MALIGNANT NEOPLASM OF UPPER-OUTER QUADRANT OF LEFT BREAST IN FEMALE, ESTROGEN RECEPTOR POSITIVE: ICD-10-CM

## 2025-05-01 DIAGNOSIS — I70.0 AORTIC ATHEROSCLEROSIS: ICD-10-CM

## 2025-05-01 DIAGNOSIS — M25.552 BILATERAL HIP PAIN: ICD-10-CM

## 2025-05-01 DIAGNOSIS — H25.9 AGE-RELATED CATARACT OF BOTH EYES, UNSPECIFIED AGE-RELATED CATARACT TYPE: ICD-10-CM

## 2025-05-01 RX ORDER — CYCLOBENZAPRINE HCL 10 MG
10 TABLET ORAL EVERY 8 HOURS PRN
COMMUNITY
Start: 2024-12-02

## 2025-05-01 NOTE — LETTER
May 2, 2025      ZION Espitia  [facility] Internal Medicine  [address] [place] [id number]  Phone: [phone]  Fax: [phone]       Patient: Arianna Rizvi   YOB: 1955  Date of Visit: 05/02/2025    Dear Dr Chapa    Lina Rizvi  was at [facility] on 05/01/2025. The patient is cleared for their procedure with no restrictions. If you have any questions or concerns, or if I can be of further assistance, please do not hesitate to contact me.    Sincerely,        ZION Ortez
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
## 2025-05-02 ENCOUNTER — TELEPHONE (OUTPATIENT)
Dept: INTERNAL MEDICINE | Facility: CLINIC | Age: 70
End: 2025-05-02
Payer: MEDICARE

## 2025-05-02 PROBLEM — M25.551 BILATERAL HIP PAIN: Status: ACTIVE | Noted: 2022-05-24

## 2025-05-02 PROBLEM — D47.1 CHRONIC MYELOPROLIFERATIVE DISEASE: Status: RESOLVED | Noted: 2024-08-08 | Resolved: 2025-05-02

## 2025-05-02 PROBLEM — C77.3 MALIGNANT NEOPLASM METASTATIC TO LYMPH NODE OF AXILLA: Status: ACTIVE | Noted: 2025-05-02

## 2025-05-02 NOTE — ASSESSMENT & PLAN NOTE
Reports chronic hip pain managed with chiropractic adjustments, acupuncture, massage therapy, water walking, and also uses ibuprofen as needed.  She is concerned about stopping ibuprofen prior to surgery and requesting alternative medication.  Discussed use of OTC Tylenol arthritis as needed.

## 2025-05-02 NOTE — ASSESSMENT & PLAN NOTE
Scheduled for right cataract extraction on 05/28/2025 with Dr Chapa.  Plans for left cataract extraction on 06/18/2025.

## 2025-05-02 NOTE — ASSESSMENT & PLAN NOTE
Patient feeling generally well today  Denies chest pain or shortness of breath  Most recent labs reviewed and essentially normal  EKG normal sinus rhythm  Able to perform ADLs and IADLs independently  Mets functional score >4  Not currently on any DOACs  Cleared from Internal Medicine standpoint for upcoming procedure

## 2025-05-06 ENCOUNTER — TELEPHONE (OUTPATIENT)
Dept: INTERNAL MEDICINE | Facility: CLINIC | Age: 70
End: 2025-05-06
Payer: MEDICARE

## 2025-05-06 NOTE — TELEPHONE ENCOUNTER
06/01/18 1113   General Information   Onset Date 05/31/18   Start of Care Date 06/01/18   Referring Physician Phoebe Lamb PA-C   Patient Profile Review/OT: Additional Occupational Profile Info See Profile for full history and prior level of function   Patient/Family Goals Statement to take pills more safely   Swallowing Evaluation Bedside swallow evaluation   Behaviorial Observations Lethargic;Initiation problems;Confused  (increased alertness as session progressed)   Mode of current nutrition NPO   Respiratory Status Room air   Comments Priscilla Way is a 80 year old male with history of advanced vascular dementia, 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 Family member present and reported patient eats puree textures and thin liquids at home given total feeding assist. Family member also reported coughing with PO intake (liquids and solids) and difficulty swallowing pills.   Clinical Swallow Evaluation   Oral Musculature unable to assess due to poor participation/comprehension   Dentition (a few missing teeth)   Mucosal Quality adequate   Laryngeal Function Voicing initiated  (soft, breathy, raspy/hoarse quality)   Clinical Swallow Eval: Thin Liquid Texture Trial   Mode of Presentation, Thin Liquids cup  (fed by family member)   Volume of Liquid or Food Presented 3-4 oz water by cup   Oral Phase of Swallow Poor AP movement  (extra-oral loss)   Oral Residue right lip drooling   Pharyngeal Phase of Swallow coughing/choking;repeated swallows  (1 instance of coughing)   Diagnostic Statement Risk for aspiration   Clinical Swallow Eval: Nectar Thick Liquid Texture Trial   Mode of Presentation, Nectar cup  (fed by family member)   Volume of Nectar Presented 4 oz   Oral Phase, [place] (oral holding at times)   Pharyngeal Phase, Nectar wet vocal quality after swallow  (mild phlegmy vocal quality intermittently)  Copied from CRM #[id number]. Topic: General Inquiry - Return Call  >> May 5, 2025 10:20 AM Raiza wrote:  Who Called: Arianna Rizvi    Patient is returning phone call    Who Left Message for Patient:Jennifer  Does the patient know what this is regarding?:call from Friday about alternatives      Preferred Method of Contact: Phone Call  Patient's Preferred Phone Number on File: [phone]   Best Call Back Number, if different:  Additional Information: stated that she received a call from the nurse. Please advise     Diagnostic Statement Risk for aspiration; better oral control with this consistency   Clinical Swallow Eval: Puree Solid Texture Trial   Mode of Presentation, Puree spoon  (fed by family member)   Volume of Puree Presented 4 oz applesauce   Oral Phase, Puree Poor AP movement  (poor oral acceptance at times; oral holding intermittently)   Pharyngeal Phase, Puree (swallow incoordination)   Diagnostic Statement Risk for aspiration due to reduced oral control and reduced attention to task   Esophageal Phase of Swallow   Patient reports or presents with symptoms of esophageal dysphagia No   General Therapy Interventions   Planned Therapy Interventions Dysphagia Treatment   Dysphagia treatment Modified diet education;Instruction of safe swallow strategies   Swallow Eval: Clinical Impressions   Skilled Criteria for Therapy Intervention Skilled criteria met.  Treatment indicated.   Functional Assessment Scale (FAS) 3   Treatment Diagnosis moderate oropharyngeal dysphagia   Diet texture recommendations Full liquid;Nectar thick liquids   Recommended Feeding/Eating Techniques maintain upright posture during/after eating for 30 mins;no straws;small sips/bites  (1:1 feeding assist; slow rate; verify swallows)   Demonstrates Need for Referral to Another Service (involved)   Therapy Frequency daily   Predicted Duration of Therapy Intervention (days/wks) 1 week   Anticipated Discharge Disposition (pending patient progress/POC)   Risks and Benefits of Treatment have been explained. Yes   Patient, family and/or staff in agreement with Plan of Care Yes   Clinical Impression Comments Patient presents with moderate oropharyngeal dysphagia characterized by reduced oral bolus control (poor oral acceptance and oral holding at times), swallow incoordination, coughing after sip of water and mild phlegmy vocal quality after sips of nectar thick liquid. No other overt aspiration signs occurred with small, controlled bites/sips of puree and  nectar thick liquid. Recommend cautiously initiate full liquid diet (nectar consistency) given 1:1 assist and swallow strategies (fully alert and upright, small single sips/bites, no straws, slow rate, verify swallows). Hold PO if signs/symptoms of aspiration present or reduced LIZZY or ability to participate. Please crush pills if possible and serve with applesauce or consider alternate form.   Total Evaluation Time   Total Evaluation Time (Minutes) 20

## 2025-05-07 NOTE — TELEPHONE ENCOUNTER
Since this is a compounded medication specifics regarding the amounts and strength are required.  I do not have this readily available.  Please call [facility] and ask them to fax a copy of their pain ointment/gels, in particular the ketoprofen Flexeril compound.

## 2025-05-07 NOTE — TELEPHONE ENCOUNTER
Spoke pt regarding recommendations regarding symptoms, pt verbalized understanding of recommendations. Pt is asking if the ketoprofen & flexeril gel can be called into [facility]

## 2025-05-20 ENCOUNTER — LAB VISIT (OUTPATIENT)
Dept: LAB | Facility: HOSPITAL | Age: 70
End: 2025-05-20
Attending: INTERNAL MEDICINE
Payer: MEDICARE

## 2025-05-20 DIAGNOSIS — Z12.31 SCREENING MAMMOGRAM FOR BREAST CANCER: ICD-10-CM

## 2025-05-20 DIAGNOSIS — C50.419: ICD-10-CM

## 2025-05-20 DIAGNOSIS — M85.80 OSTEOPENIA, UNSPECIFIED LOCATION: ICD-10-CM

## 2025-05-20 LAB
25(OH)D3+25(OH)D2 SERPL-MCNC: 68 NG/ML (ref 30–80)
ALBUMIN SERPL-MCNC: 3.9 G/DL (ref 3.4–4.8)
ALBUMIN/GLOB SERPL: 1.1 RATIO (ref 1.1–2)
ALP SERPL-CCNC: 88 UNIT/L (ref 40–150)
ALT SERPL-CCNC: 16 UNIT/L (ref 0–55)
ANION GAP SERPL CALC-SCNC: 10 MEQ/L
AST SERPL-CCNC: 12 UNIT/L (ref 11–45)
BASOPHILS # BLD AUTO: 0.07 X10(3)/MCL
BASOPHILS NFR BLD AUTO: 0.7 %
BILIRUB SERPL-MCNC: 0.5 MG/DL
BUN SERPL-MCNC: 14.5 MG/DL (ref 9.8–20.1)
CALCIUM SERPL-MCNC: 9.4 MG/DL (ref 8.4–10.2)
CEA SERPL-MCNC: 2.13 NG/ML (ref 0–3)
CHLORIDE SERPL-SCNC: 106 MMOL/L (ref 98–107)
CO2 SERPL-SCNC: 23 MMOL/L (ref 23–31)
CREAT SERPL-MCNC: 0.67 MG/DL (ref 0.55–1.02)
CREAT/UREA NIT SERPL: 22
EOSINOPHIL # BLD AUTO: 0.31 X10(3)/MCL (ref 0–0.9)
EOSINOPHIL NFR BLD AUTO: 2.9 %
ERYTHROCYTE [DISTWIDTH] IN BLOOD BY AUTOMATED COUNT: 13.6 % (ref 11.5–17)
GFR SERPLBLD CREATININE-BSD FMLA CKD-EPI: >60 ML/MIN/1.73/M2
GLOBULIN SER-MCNC: 3.6 GM/DL (ref 2.4–3.5)
GLUCOSE SERPL-MCNC: 121 MG/DL (ref 82–115)
HCT VFR BLD AUTO: 41.3 % (ref 37–47)
HGB BLD-MCNC: 13.6 G/DL (ref 12–16)
IMM GRANULOCYTES # BLD AUTO: 0.04 X10(3)/MCL (ref 0–0.04)
IMM GRANULOCYTES NFR BLD AUTO: 0.4 %
LYMPHOCYTES # BLD AUTO: 3.01 X10(3)/MCL (ref 0.6–4.6)
LYMPHOCYTES NFR BLD AUTO: 28.3 %
MCH RBC QN AUTO: 28.9 PG (ref 27–31)
MCHC RBC AUTO-ENTMCNC: 32.9 G/DL (ref 33–36)
MCV RBC AUTO: 87.9 FL (ref 80–94)
MONOCYTES # BLD AUTO: 1.04 X10(3)/MCL (ref 0.1–1.3)
MONOCYTES NFR BLD AUTO: 9.8 %
NEUTROPHILS # BLD AUTO: 6.17 X10(3)/MCL (ref 2.1–9.2)
NEUTROPHILS NFR BLD AUTO: 57.9 %
PLATELET # BLD AUTO: 335 X10(3)/MCL (ref 130–400)
PMV BLD AUTO: 10 FL (ref 7.4–10.4)
POTASSIUM SERPL-SCNC: 4.5 MMOL/L (ref 3.5–5.1)
PROT SERPL-MCNC: 7.5 GM/DL (ref 5.8–7.6)
RBC # BLD AUTO: 4.7 X10(6)/MCL (ref 4.2–5.4)
SODIUM SERPL-SCNC: 139 MMOL/L (ref 136–145)
WBC # BLD AUTO: 10.64 X10(3)/MCL (ref 4.5–11.5)

## 2025-05-20 PROCEDURE — 82306 VITAMIN D 25 HYDROXY: CPT

## 2025-05-20 PROCEDURE — 86300 IMMUNOASSAY TUMOR CA 15-3: CPT

## 2025-05-20 PROCEDURE — 80053 COMPREHEN METABOLIC PANEL: CPT

## 2025-05-20 PROCEDURE — 85025 COMPLETE CBC W/AUTO DIFF WBC: CPT

## 2025-05-20 PROCEDURE — 82378 CARCINOEMBRYONIC ANTIGEN: CPT

## 2025-05-20 PROCEDURE — 36415 COLL VENOUS BLD VENIPUNCTURE: CPT

## 2025-05-21 LAB — CANCER AG27-29 SERPL-ACNC: 21.2 U/ML

## 2025-05-26 NOTE — PROGRESS NOTES
Subjective:       Patient ID: Arianna Rizvi is a 70 y.o. female.    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  Oncotype DX score came back at 17 placing her in the low risk category  Focal ductal hyperplasia of the left breast.    Fibrocystic changes of the left breast  Remote history of benign right breast lesion status post lumpectomy in 1995  Chronic leukocytosis, since 2004  Normal bone density at baseline; Repeat on 9/9/2020 showed osteopenia bilateral hips  Vitamin D Deficiency-- now on weekly therapy.     Current Treatment:   Ca/Vit D    Treatment History:  Left breast lumpectomy and sentinel lymph node biopsy performed February 21, 2018--> pathology showed 1.5 cm primary tumor, well-differentiated with Ki-67 score of 32% and one sentinel lymph node with micrometastases (>0.2mm but < 2mm)  Adjvant radiation therapy 4/30/18- 6/11/18   Femara 2.5 mg p.o. daily started June 2018.  Switched from Femara to anastrozole on 4/6/2020.  This was due to intolerable side effects to Femara. She completed therapy on 06/01/2023.      HPI:  This is a very pleasant 70-year-old female kindly referred by Dr. Hutchison for further evaluation workup following lumpectomy for well-differentiated infiltrating ductal carcinoma the left breast. The patient's history is remarkable for routine screening mammography performed December 7, 2017 which revealed a suspicious 1 cm mass with indistinct margins in the upper outer quadrant of the left breast.  She then required additional views of the left breast with ultrasound performed December 14, 2017 which revealed irregular shaped hypoechoic mass at the 3 o'clock position of the left breast 3 cm from the nipple measuring 1.7 x 1.7 x 2 cm. The patient was then set up for ultrasound guided vacuum-assisted needle core biopsy done January 25, 2018. Pathology came  back showing well-differentiated infiltrating ductal carcinoma, ER positive at 95%, ID +95%, and HER-2/rosalino negative. Ki-67 score of 32%.  The patient was referred to Dr. Hutchison and underwent preoperative breast MRI performed Fabry 14 2018. This revealed a 2 cm lobulated spiculated enhancing mass at the 3 o'clock position of the left breast. No significant or concerning internal mammary or axillary adenopathy. No other suspicious enhancing lesions in either breast.  The patient was then taken to the operating room on February 21, 2018 and underwent lumpectomy with sentinel lymph node biopsy of the left breast. Pathology revealed a 1.5 cm primary tumor well-differentiated , with clear margins. Micrometastases and one sentinel lymph node as described above. Pathologic T1c N1(mi).  The patient recovered very well from surgery and was then referred to medical oncology.  Oncotype DX assay sent off --> came back with score of 17 placing her in the low risk category for recurrence.  Baseline DEXA scan performed April 3, 2018 shows normal bone density with T score of 2.5 in the AP spine and -1.0 in the right hip and -0.1 in the left hip.  In addition, workup for leukocytosis on April 16, 2018 showed no evidence of any dysplastic cells on peripheral smear.  HIV and hepatitis panels negative/nonreactive.  LDH normal at 145.  WBC had come back down to normal as well at 9.8.  S/p completion of adjuvant radiation therapy 4/30/18- 6/11/18.  She underwent mammogram of the left breast done June 27, 2018 which showed BI-RADS 2 with recommendation for routine screening mammogram December 2018 of her bilateral breasts.  The patient agreed to start on aromatase inhibitor therapy after patient education was started on Femara towards the end of June 2018.  Patient was treated with Femara until 4/6/2020, at that time switch to anastrozole due to intolerance to Femara.  Completed adjuvant endocrine therapy in June of 2023.  BCR/ABL p190  and p210 drawn on 09/21/2023 both negative.  CT scan of the abdomen and pelvis with contrast on 10/30/2023 showed diverticulosis with no acute inflammation and no acute abnormalities in the abdomen or pelvis.     **Most recent DEXA scan on 10/09/2022 showed osteopenia.  **Patient has undergone routine mammograms, most recently on 03/18/2025 [facility].    Interval History:   Patient is here today for a follow-up for breast cancer.  Overall she was doing well. From a breast cancer standpoint, she has no issues to discuss.  She does state that she continues to have joint pain previous aromatase inhibitor use.  She will be having cataract surgery tomorrow.        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 Scot Chavis    dental implants      HYSTERECTOMY      LUMPECTOMY,BREAST, WITH RADIOACTIVE SEED LOCALIZATION AND SENTINEL LYMPH NODE BIOPSY Left 02/21/2018    TONSILLECTOMY       Social History     Socioeconomic History    Marital status: Single   Tobacco Use    Smoking status: Never    Smokeless tobacco: Never   Substance and Sexual Activity    Alcohol use: Not Currently    Drug use: Never     Social Drivers of Health     Financial Resource Strain: Low Risk  (5/1/2025)    Overall Financial Resource Strain (CARDIA)     Difficulty of Paying Living Expenses: Not hard at all   Food Insecurity: No Food Insecurity (5/1/2025)    Hunger Vital Sign     Worried About Running Out of Food in the Last Year: Never true     Ran Out of Food in the Last Year: Never true   Transportation Needs: No Transportation Needs (5/1/2025)    PRAPARE - Transportation     Lack of Transportation (Medical): No     Lack of Transportation (Non-Medical): No   Physical  Activity: Sufficiently Active (5/1/2025)    Exercise Vital Sign     Days of Exercise per Week: 5 days     Minutes of Exercise per Session: 50 min   Stress: No Stress Concern Present (5/1/2025)    [employer] [place] [employer] - Occupational Stress Questionnaire     Feeling of Stress : Not at all   Housing Stability: Low Risk  (5/1/2025)    Housing Stability Vital Sign     Unable to Pay for Housing in the Last Year: No     Number of Times Moved in the Last Year: 0     Homeless in the Last Year: No      Family History   Family history unknown: Yes      Review of patient's allergies indicates:   Allergen Reactions    Atropine      Other reaction(s): heart beats fast    Epinephrine      Other reaction(s): heart beats fast    Cephalexin Rash     Other reaction(s): water blisters all over  Blisters      Doxycycline Rash    Latex Rash     blisters    Penicillins Rash     Other reaction(s): blisters all over  blisters      Sulfamethoxazole-trimethoprim Photosensitivity      Review of Systems   Constitutional:  Negative for chills, diaphoresis, fatigue, fever and unexpected weight change.   HENT:  Negative for nasal congestion, mouth sores, sinus pressure/congestion and sore throat.    Eyes:  Negative for pain and visual disturbance.   Respiratory:  Negative for cough, chest tightness and shortness of breath.    Cardiovascular:  Negative for chest pain, palpitations and leg swelling.   Gastrointestinal:  Negative for abdominal distention, abdominal pain, blood in stool, constipation and diarrhea.   Genitourinary:  Negative for dysuria, frequency and hematuria.   Musculoskeletal:  Negative for arthralgias and back pain.   Integumentary:  Negative for rash.   Neurological:  Negative for dizziness, weakness, numbness and headaches.   Hematological:  Negative for adenopathy.   Psychiatric/Behavioral:  Negative for confusion.          Objective:      Physical Exam  Vitals reviewed. Exam conducted with a chaperone  present.   Constitutional:       General: She is awake.      Appearance: Normal appearance. She is well-developed.   HENT:      Head: Normocephalic and atraumatic.   Eyes:      General: Lids are normal. Vision grossly intact.      Extraocular Movements: Extraocular movements intact.      Conjunctiva/sclera: Conjunctivae normal.   Cardiovascular:      Rate and Rhythm: Normal rate and regular rhythm.      Pulses: Normal pulses.      Heart sounds: Normal heart sounds.   Pulmonary:      Effort: Pulmonary effort is normal.      Breath sounds: Normal breath sounds.   Chest:      Chest wall: Tenderness present. No mass or swelling.   Breasts:     Right: Tenderness present.      Left: Tenderness present.      Comments: No abnormal masses and no nipple inversion on either breast.  Abdominal:      General: Bowel sounds are normal. There is no distension.      Palpations: Abdomen is soft.      Tenderness: There is no abdominal tenderness.   Musculoskeletal:      Cervical back: Full passive range of motion without pain.      Right lower leg: No edema.      Left lower leg: No edema.   Lymphadenopathy:      Cervical: No cervical adenopathy.      Upper Body:      Right upper body: No supraclavicular, axillary or pectoral adenopathy.      Left upper body: No supraclavicular, axillary or pectoral adenopathy.   Skin:     General: Skin is warm and dry.   Neurological:      General: No focal deficit present.      Mental Status: She is alert and oriented to person, place, and time.   Psychiatric:         Attention and Perception: Attention and perception normal.         Behavior: Behavior is cooperative.         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   Fibrocystic changes of the left breast  Remote history of benign right breast lesion status post lumpectomy in  1995  Intermittent/chronic leukocytosis--> resolved.   Normal bone density at baseline; repeat DEXA 09/2020 showed osteopenia of bilateral hips   Vitamin D Deficiency--> now on weekly replacement therapy at 50,000 units.  Aromatase inhibitor induced arthralgias and hot flashes  Osteopenia      Plan:       We will continue with anastrozole as she seems to be tolerating this better than the letrozole.  Offered to try exemestane but she is not interested at this time.  She completed therapy on 06/01/2023.    Continue with outpatient therapy for chronic back pain     Continue with acupuncture as needed.    Blood work including BCR/ABL on 09/21/2023 due to leukocytosis was negative.     Routine screening mammogram on 03/18/2025 at [facility] negative.    Return to clinic in 6 months.      Labs: CBC, CMP, Vitamin D Level, CA 27.29 and CEA     All questions were answered to the best of my ability and the patient understands the plan moving forward.      Visit today included increased complexity associated with the care of the episodic problem breast cancer, addressing and managing the longitudinal care of the patient's breast cancer.         Kaden Chapa II, MD

## 2025-05-27 ENCOUNTER — OFFICE VISIT (OUTPATIENT)
Dept: HEMATOLOGY/ONCOLOGY | Facility: CLINIC | Age: 70
End: 2025-05-27
Payer: MEDICARE

## 2025-05-27 VITALS
BODY MASS INDEX: 34.08 KG/M2 | RESPIRATION RATE: 18 BRPM | OXYGEN SATURATION: 99 % | HEART RATE: 86 BPM | DIASTOLIC BLOOD PRESSURE: 81 MMHG | SYSTOLIC BLOOD PRESSURE: 162 MMHG | WEIGHT: 224.88 LBS | HEIGHT: 68 IN

## 2025-05-27 DIAGNOSIS — Z12.31 SCREENING MAMMOGRAM FOR BREAST CANCER: ICD-10-CM

## 2025-05-27 DIAGNOSIS — C50.419: ICD-10-CM

## 2025-05-27 DIAGNOSIS — M85.80 OSTEOPENIA, UNSPECIFIED LOCATION: ICD-10-CM

## 2025-05-27 PROCEDURE — 99213 OFFICE O/P EST LOW 20 MIN: CPT | Mod: PBBFAC | Performed by: INTERNAL MEDICINE

## 2025-05-27 PROCEDURE — 99999 PR PBB SHADOW E&M-EST. PATIENT-LVL III: CPT | Mod: PBBFAC,,, | Performed by: INTERNAL MEDICINE

## 2025-08-04 DIAGNOSIS — E55.9 VITAMIN D DEFICIENCY: Primary | ICD-10-CM

## 2025-08-04 DIAGNOSIS — Z13.6 SCREENING FOR CARDIOVASCULAR, RESPIRATORY, AND GENITOURINARY DISEASES: ICD-10-CM

## 2025-08-04 DIAGNOSIS — R73.09 IMPAIRED GLUCOSE REGULATION: ICD-10-CM

## 2025-08-04 DIAGNOSIS — Z13.29 SCREENING FOR ENDOCRINE, NUTRITIONAL, METABOLIC AND IMMUNITY DISORDER: ICD-10-CM

## 2025-08-04 DIAGNOSIS — Z79.899 ENCOUNTER FOR LONG-TERM (CURRENT) USE OF MEDICATIONS: ICD-10-CM

## 2025-08-04 DIAGNOSIS — Z00.00 MEDICARE ANNUAL WELLNESS VISIT, SUBSEQUENT: ICD-10-CM

## 2025-08-04 DIAGNOSIS — I70.0 AORTIC ATHEROSCLEROSIS: ICD-10-CM

## 2025-08-04 DIAGNOSIS — Z13.21 SCREENING FOR ENDOCRINE, NUTRITIONAL, METABOLIC AND IMMUNITY DISORDER: ICD-10-CM

## 2025-08-04 DIAGNOSIS — Z13.0 SCREENING FOR ENDOCRINE, NUTRITIONAL, METABOLIC AND IMMUNITY DISORDER: ICD-10-CM

## 2025-08-04 DIAGNOSIS — Z13.83 SCREENING FOR CARDIOVASCULAR, RESPIRATORY, AND GENITOURINARY DISEASES: ICD-10-CM

## 2025-08-04 DIAGNOSIS — Z13.89 SCREENING FOR CARDIOVASCULAR, RESPIRATORY, AND GENITOURINARY DISEASES: ICD-10-CM

## 2025-08-04 DIAGNOSIS — Z13.228 SCREENING FOR ENDOCRINE, NUTRITIONAL, METABOLIC AND IMMUNITY DISORDER: ICD-10-CM

## 2025-08-05 ENCOUNTER — TELEPHONE (OUTPATIENT)
Dept: INTERNAL MEDICINE | Facility: CLINIC | Age: 70
End: 2025-08-05
Payer: MEDICARE

## 2025-08-05 NOTE — TELEPHONE ENCOUNTER
----- Message from Med Assistant Floyd sent at 8/4/2025 10:37 AM CDT -----  Regarding:  Tuesday 8-12-25  Wellness Appointment    Fasting wellness labs ordered and ready to do.     Last Wellness 8-8-24

## 2025-08-06 PROBLEM — T46.6X5A STATIN-INDUCED MYOSITIS: Status: ACTIVE | Noted: 2025-08-06

## 2025-08-06 PROBLEM — M60.9 STATIN-INDUCED MYOSITIS: Status: ACTIVE | Noted: 2025-08-06

## 2025-08-07 ENCOUNTER — APPOINTMENT (OUTPATIENT)
Dept: LAB | Facility: HOSPITAL | Age: 70
End: 2025-08-07
Attending: INTERNAL MEDICINE
Payer: MEDICARE

## 2025-08-07 DIAGNOSIS — Z13.0 SCREENING FOR ENDOCRINE, NUTRITIONAL, METABOLIC AND IMMUNITY DISORDER: ICD-10-CM

## 2025-08-07 DIAGNOSIS — Z13.29 SCREENING FOR ENDOCRINE, NUTRITIONAL, METABOLIC AND IMMUNITY DISORDER: ICD-10-CM

## 2025-08-07 DIAGNOSIS — Z00.00 MEDICARE ANNUAL WELLNESS VISIT, SUBSEQUENT: ICD-10-CM

## 2025-08-07 DIAGNOSIS — Z13.21 SCREENING FOR ENDOCRINE, NUTRITIONAL, METABOLIC AND IMMUNITY DISORDER: ICD-10-CM

## 2025-08-07 DIAGNOSIS — Z13.228 SCREENING FOR ENDOCRINE, NUTRITIONAL, METABOLIC AND IMMUNITY DISORDER: ICD-10-CM

## 2025-08-07 DIAGNOSIS — I70.0 AORTIC ATHEROSCLEROSIS: ICD-10-CM

## 2025-08-07 DIAGNOSIS — E55.9 VITAMIN D DEFICIENCY: ICD-10-CM

## 2025-08-07 DIAGNOSIS — R73.09 IMPAIRED GLUCOSE REGULATION: ICD-10-CM

## 2025-08-07 DIAGNOSIS — Z13.89 SCREENING FOR CARDIOVASCULAR, RESPIRATORY, AND GENITOURINARY DISEASES: ICD-10-CM

## 2025-08-07 DIAGNOSIS — Z79.899 ENCOUNTER FOR LONG-TERM (CURRENT) USE OF MEDICATIONS: ICD-10-CM

## 2025-08-07 DIAGNOSIS — Z13.83 SCREENING FOR CARDIOVASCULAR, RESPIRATORY, AND GENITOURINARY DISEASES: ICD-10-CM

## 2025-08-07 DIAGNOSIS — Z13.6 SCREENING FOR CARDIOVASCULAR, RESPIRATORY, AND GENITOURINARY DISEASES: ICD-10-CM

## 2025-08-07 LAB
ALBUMIN SERPL-MCNC: 4.1 G/DL (ref 3.4–4.8)
ALBUMIN/GLOB SERPL: 1.3 RATIO (ref 1.1–2)
ALP SERPL-CCNC: 79 UNIT/L (ref 40–150)
ALT SERPL-CCNC: 14 UNIT/L (ref 0–55)
ANION GAP SERPL CALC-SCNC: 11 MEQ/L
AST SERPL-CCNC: 10 UNIT/L (ref 11–45)
BASOPHILS # BLD AUTO: 0.1 X10(3)/MCL
BASOPHILS NFR BLD AUTO: 1 %
BILIRUB SERPL-MCNC: 0.6 MG/DL
BUN SERPL-MCNC: 14.9 MG/DL (ref 9.8–20.1)
CALCIUM SERPL-MCNC: 9.5 MG/DL (ref 8.4–10.2)
CHLORIDE SERPL-SCNC: 105 MMOL/L (ref 98–107)
CHOLEST SERPL-MCNC: 226 MG/DL
CHOLEST/HDLC SERPL: 4 {RATIO} (ref 0–5)
CO2 SERPL-SCNC: 24 MMOL/L (ref 23–31)
CREAT SERPL-MCNC: 0.68 MG/DL (ref 0.55–1.02)
CREAT/UREA NIT SERPL: 22
EOSINOPHIL # BLD AUTO: 0.26 X10(3)/MCL (ref 0–0.9)
EOSINOPHIL NFR BLD AUTO: 2.5 %
ERYTHROCYTE [DISTWIDTH] IN BLOOD BY AUTOMATED COUNT: 13.7 % (ref 11.5–17)
EST. AVERAGE GLUCOSE BLD GHB EST-MCNC: 119.8 MG/DL
GFR SERPLBLD CREATININE-BSD FMLA CKD-EPI: >60 ML/MIN/1.73/M2
GLOBULIN SER-MCNC: 3.1 GM/DL (ref 2.4–3.5)
GLUCOSE SERPL-MCNC: 120 MG/DL (ref 82–115)
HBA1C MFR BLD: 5.8 %
HCT VFR BLD AUTO: 44 % (ref 37–47)
HDLC SERPL-MCNC: 59 MG/DL (ref 35–60)
HGB BLD-MCNC: 13.8 G/DL (ref 12–16)
IMM GRANULOCYTES # BLD AUTO: 0.07 X10(3)/MCL (ref 0–0.04)
IMM GRANULOCYTES NFR BLD AUTO: 0.7 %
LDLC SERPL CALC-MCNC: 140 MG/DL (ref 50–140)
LYMPHOCYTES # BLD AUTO: 2.92 X10(3)/MCL (ref 0.6–4.6)
LYMPHOCYTES NFR BLD AUTO: 27.9 %
MCH RBC QN AUTO: 27.9 PG (ref 27–31)
MCHC RBC AUTO-ENTMCNC: 31.4 G/DL (ref 33–36)
MCV RBC AUTO: 89.1 FL (ref 80–94)
MONOCYTES # BLD AUTO: 0.85 X10(3)/MCL (ref 0.1–1.3)
MONOCYTES NFR BLD AUTO: 8.1 %
NEUTROPHILS # BLD AUTO: 6.25 X10(3)/MCL (ref 2.1–9.2)
NEUTROPHILS NFR BLD AUTO: 59.8 %
NRBC BLD AUTO-RTO: 0 %
PLATELET # BLD AUTO: 335 X10(3)/MCL (ref 130–400)
PMV BLD AUTO: 10.6 FL (ref 7.4–10.4)
POTASSIUM SERPL-SCNC: 4.8 MMOL/L (ref 3.5–5.1)
PROT SERPL-MCNC: 7.2 GM/DL (ref 5.8–7.6)
RBC # BLD AUTO: 4.94 X10(6)/MCL (ref 4.2–5.4)
SODIUM SERPL-SCNC: 140 MMOL/L (ref 136–145)
TRIGL SERPL-MCNC: 134 MG/DL (ref 37–140)
TSH SERPL-ACNC: 0.91 UIU/ML (ref 0.35–4.94)
VLDLC SERPL CALC-MCNC: 27 MG/DL
WBC # BLD AUTO: 10.45 X10(3)/MCL (ref 4.5–11.5)

## 2025-08-07 PROCEDURE — 85025 COMPLETE CBC W/AUTO DIFF WBC: CPT

## 2025-08-07 PROCEDURE — 80061 LIPID PANEL: CPT

## 2025-08-07 PROCEDURE — 36415 COLL VENOUS BLD VENIPUNCTURE: CPT

## 2025-08-07 PROCEDURE — 84443 ASSAY THYROID STIM HORMONE: CPT

## 2025-08-07 PROCEDURE — 83036 HEMOGLOBIN GLYCOSYLATED A1C: CPT

## 2025-08-07 PROCEDURE — 80053 COMPREHEN METABOLIC PANEL: CPT

## 2025-08-12 ENCOUNTER — OFFICE VISIT (OUTPATIENT)
Dept: INTERNAL MEDICINE | Facility: CLINIC | Age: 70
End: 2025-08-12
Payer: MEDICARE

## 2025-08-12 VITALS
HEIGHT: 68 IN | OXYGEN SATURATION: 98 % | DIASTOLIC BLOOD PRESSURE: 70 MMHG | WEIGHT: 227 LBS | BODY MASS INDEX: 34.4 KG/M2 | HEART RATE: 84 BPM | SYSTOLIC BLOOD PRESSURE: 138 MMHG

## 2025-08-12 DIAGNOSIS — M60.9 STATIN-INDUCED MYOSITIS: ICD-10-CM

## 2025-08-12 DIAGNOSIS — Z23 NEED FOR SHINGLES VACCINE: ICD-10-CM

## 2025-08-12 DIAGNOSIS — Z00.00 MEDICARE ANNUAL WELLNESS VISIT, SUBSEQUENT: Primary | ICD-10-CM

## 2025-08-12 DIAGNOSIS — N39.46 MIXED INCONTINENCE URGE AND STRESS (MALE)(FEMALE): ICD-10-CM

## 2025-08-12 DIAGNOSIS — E66.09 CLASS 1 OBESITY DUE TO EXCESS CALORIES WITH SERIOUS COMORBIDITY AND BODY MASS INDEX (BMI) OF 34.0 TO 34.9 IN ADULT: ICD-10-CM

## 2025-08-12 DIAGNOSIS — T46.6X5A STATIN-INDUCED MYOSITIS: ICD-10-CM

## 2025-08-12 DIAGNOSIS — I10 PRIMARY HYPERTENSION: ICD-10-CM

## 2025-08-12 DIAGNOSIS — E66.811 CLASS 1 OBESITY DUE TO EXCESS CALORIES WITH SERIOUS COMORBIDITY AND BODY MASS INDEX (BMI) OF 34.0 TO 34.9 IN ADULT: ICD-10-CM

## 2025-08-12 DIAGNOSIS — Z78.0 POST-MENOPAUSAL: ICD-10-CM

## 2025-08-12 RX ORDER — AMLODIPINE BESYLATE 10 MG/1
10 TABLET ORAL DAILY
Qty: 90 TABLET | Refills: 3 | Status: SHIPPED | OUTPATIENT
Start: 2025-08-12 | End: 2025-08-12

## 2025-08-12 RX ORDER — AMLODIPINE BESYLATE 5 MG/1
5 TABLET ORAL DAILY
Qty: 90 TABLET | Refills: 3 | Status: SHIPPED | OUTPATIENT
Start: 2025-08-12 | End: 2026-08-12

## 2025-09-02 ENCOUNTER — HOSPITAL ENCOUNTER (OUTPATIENT)
Dept: RADIOLOGY | Facility: HOSPITAL | Age: 70
Discharge: HOME | End: 2025-09-02
Attending: INTERNAL MEDICINE
Payer: MEDICARE

## 2025-09-02 DIAGNOSIS — Z78.0 POST-MENOPAUSAL: ICD-10-CM

## 2025-09-02 PROCEDURE — 77081 DXA BONE DENSITY APPENDICULR: CPT | Mod: TC

## 2025-09-02 PROCEDURE — 77080 DXA BONE DENSITY AXIAL: CPT | Mod: XU,TC

## 2025-09-03 PROBLEM — M81.0 AGE-RELATED OSTEOPOROSIS WITHOUT CURRENT PATHOLOGICAL FRACTURE: Status: ACTIVE | Noted: 2025-09-03

## 2025-09-03 PROBLEM — M81.0 AGE-RELATED OSTEOPOROSIS WITHOUT CURRENT PATHOLOGICAL FRACTURE: Chronic | Status: ACTIVE | Noted: 2025-09-03
